# Patient Record
Sex: MALE | Race: BLACK OR AFRICAN AMERICAN | Employment: OTHER | URBAN - METROPOLITAN AREA
[De-identification: names, ages, dates, MRNs, and addresses within clinical notes are randomized per-mention and may not be internally consistent; named-entity substitution may affect disease eponyms.]

---

## 2018-09-14 ENCOUNTER — PREP FOR PROCEDURE (OUTPATIENT)
Dept: SURGERY | Age: 38
End: 2018-09-14

## 2018-09-14 RX ORDER — SODIUM CHLORIDE 9 MG/ML
INJECTION, SOLUTION INTRAVENOUS CONTINUOUS
Status: CANCELLED | OUTPATIENT
Start: 2018-09-14 | End: 2019-09-14

## 2018-09-17 RX ORDER — ALBUTEROL SULFATE 2.5 MG/3ML
2.5 SOLUTION RESPIRATORY (INHALATION) EVERY 6 HOURS PRN
COMMUNITY

## 2018-09-17 NOTE — PROGRESS NOTES
Matthew 35 PRE-ADMISSION TESTING GENERAL INSTRUCTIONS- [facility]-phone number:[phone]    GENERAL INSTRUCTIONS  [x] Antibacterial Soap shower Night before and/or AM of Surgery  [] Uriel wipe instruction sheet and wipes given. [x] Nothing by mouth after midnight, including gum, candy, mints, or water. [x] You may brush your teeth, gargle, but do NOT swallow water. []Hibiclens shower  the night before and the morning of surgery. Do not use             Hibiclens on your face or head. [x]No smoking, chewing tobacco, illegal drugs, or alcohol within 24 hours of your surgery. [x] Jewelry, valuables or body piercing's should not be brought to the hospital. All body and/or tongue piercing's must be removed prior to arriving to hospital.  ALL hair pins must be removed. [x] Do not wear makeup, lotions, powders, deodorant. Nail polish as directed by the nurse. [x] Arrange transportation to and from the hospital.  Arrange for someone to be with you for the remainder of the day and for 24 hours after your procedure due to having had anesthesia. [x] Bring insurance card and photo ID.  [] Transfusion Bracelet: Please bring with you to hospital, day of surgery  [] Bring urine specimen day of surgery. Any small container is acceptable. [x] Use inhalers the morning of surgery and bring with you to hospital.   []Bring copy of living will or healthcare power of  papers to be placed in your electronic record. [] CPAP/BI-PAP: Please bring your machine if you are to spend the night in the hospital.     ENDOSCOPY INSTRUCTIONS:   [x] Bowel prep instructions reviewed. [] Nothing by mouth after midnight, including gum, candy, mints, or water.  [] You may brush your teeth, gargle, but do NOT swallow water. [x] Do not wear makeup, lotions, powders, deodorant. Nail polish as directed by the nurse.   [] Arrange transportation to and from the hospital.  Arrange for someone to be with you for the remainder of the day due to having had anesthesia. PARKING INSTRUCTIONS:   [x] Arrival [id number]  · [x] Parking lot 1 is located on [facility] (the corner of [facility] and [facility]). To enter, press the button and the gate will lift. A free token will be provided to exit the lot. One car per patient is allowed to park in this lot. All other cars are to park on [address] [place] [address] Street either in the parking garage or the handicap lot. [] Free  parking is available on [address] [place] [address]. · [] To reach the [facility] lobby from [address] [place] [address], upon entering the hospital, take elevator B to the 3rd floor. EDUCATION INSTRUCTIONS:      [] Knee or hip replacement booklet & exercise pamphlets given. [] Maggieu [address] placed in chart. [] Pre-admission Testing educational folder given  [] Incentive Spirometry,coughing & deep breathing exercises reviewed. []Medication information sheet(s)   []Fluoroscopy-Xray used in surgery reviewed with patient. Educational pamphlet placed in chart. [x]Pain: Post-op pain is normal and to be expected. You will be asked to rate your pain from 0-10(a zero is not acceptable-education is needed). Your post-op pain goal is:2[] Ask your nurse for your pain medication. [] Joint camp offered. [] Joint replacement booklets given. []Other     MEDICATION INSTRUCTIONS:   []Bring a complete list of your medications, please write the last time you took the medicine, give this list to the nurse. [x] Take the following medications the morning of surgery with 1-2 ounces of water: see med list[x] Stop herbal supplements and vitamins 5 days before your surgery. [] DO NOT take any diabetic medicine the morning of surgery. Follow instructions for insulin the day before surgery. [] If you are diabetic and your blood sugar is low or you feel symptomatic, you may drink 1-2 ounces of apple juice or take a glucose tablet.   The morning of your procedure, you

## 2018-09-20 ENCOUNTER — ANESTHESIA (OUTPATIENT)
Dept: ENDOSCOPY | Age: 38
End: 2018-09-20
Payer: MEDICARE

## 2018-09-20 ENCOUNTER — ANESTHESIA EVENT (OUTPATIENT)
Dept: ENDOSCOPY | Age: 38
End: 2018-09-20
Payer: MEDICARE

## 2018-09-20 ENCOUNTER — HOSPITAL ENCOUNTER (OUTPATIENT)
Age: 38
Setting detail: OUTPATIENT SURGERY
Discharge: HOME | End: 2018-09-20
Attending: SURGERY | Admitting: SURGERY
Payer: MEDICARE

## 2018-09-20 VITALS
SYSTOLIC BLOOD PRESSURE: 114 MMHG | BODY MASS INDEX: 35.85 KG/M2 | DIASTOLIC BLOOD PRESSURE: 60 MMHG | WEIGHT: 210 LBS | RESPIRATION RATE: 18 BRPM | TEMPERATURE: 97.6 F | HEIGHT: 64 IN | HEART RATE: 88 BPM | OXYGEN SATURATION: 98 %

## 2018-09-20 VITALS
OXYGEN SATURATION: 95 % | SYSTOLIC BLOOD PRESSURE: 111 MMHG | RESPIRATION RATE: 17 BRPM | DIASTOLIC BLOOD PRESSURE: 78 MMHG

## 2018-09-20 PROCEDURE — 3700000000 HC ANESTHESIA ATTENDED CARE: Performed by: SURGERY

## 2018-09-20 PROCEDURE — 6360000002 HC RX W HCPCS: Performed by: NURSE ANESTHETIST, CERTIFIED REGISTERED

## 2018-09-20 PROCEDURE — 3700000001 HC ADD 15 MINUTES (ANESTHESIA): Performed by: SURGERY

## 2018-09-20 PROCEDURE — 2580000003 HC RX 258: Performed by: NURSE ANESTHETIST, CERTIFIED REGISTERED

## 2018-09-20 PROCEDURE — 3609009500 HC COLONOSCOPY DIAGNOSTIC OR SCREENING: Performed by: SURGERY

## 2018-09-20 PROCEDURE — 2580000003 HC RX 258: Performed by: SURGERY

## 2018-09-20 PROCEDURE — 7100000010 HC PHASE II RECOVERY - FIRST 15 MIN: Performed by: SURGERY

## 2018-09-20 PROCEDURE — 7100000011 HC PHASE II RECOVERY - ADDTL 15 MIN: Performed by: SURGERY

## 2018-09-20 RX ORDER — SODIUM CHLORIDE 9 MG/ML
INJECTION, SOLUTION INTRAVENOUS CONTINUOUS
Status: DISCONTINUED | OUTPATIENT
Start: 2018-09-20 | End: 2018-09-20 | Stop reason: HOSPADM

## 2018-09-20 RX ORDER — SODIUM CHLORIDE 9 MG/ML
INJECTION, SOLUTION INTRAVENOUS CONTINUOUS PRN
Status: DISCONTINUED | OUTPATIENT
Start: 2018-09-20 | End: 2018-09-20 | Stop reason: SDUPTHER

## 2018-09-20 RX ORDER — 0.9 % SODIUM CHLORIDE 0.9 %
10 VIAL (ML) INJECTION PRN
Status: DISCONTINUED | OUTPATIENT
Start: 2018-09-20 | End: 2018-09-20 | Stop reason: HOSPADM

## 2018-09-20 RX ORDER — PROPOFOL 10 MG/ML
INJECTION, EMULSION INTRAVENOUS PRN
Status: DISCONTINUED | OUTPATIENT
Start: 2018-09-20 | End: 2018-09-20 | Stop reason: SDUPTHER

## 2018-09-20 RX ORDER — 0.9 % SODIUM CHLORIDE 0.9 %
10 VIAL (ML) INJECTION EVERY 12 HOURS SCHEDULED
Status: DISCONTINUED | OUTPATIENT
Start: 2018-09-20 | End: 2018-09-20 | Stop reason: HOSPADM

## 2018-09-20 RX ADMIN — PROPOFOL 100 MG: 10 INJECTION, EMULSION INTRAVENOUS at 10:30

## 2018-09-20 RX ADMIN — PROPOFOL 30 MG: 10 INJECTION, EMULSION INTRAVENOUS at 10:40

## 2018-09-20 RX ADMIN — PROPOFOL 50 MG: 10 INJECTION, EMULSION INTRAVENOUS at 10:33

## 2018-09-20 RX ADMIN — PROPOFOL 150 MG: 10 INJECTION, EMULSION INTRAVENOUS at 10:25

## 2018-09-20 RX ADMIN — PROPOFOL 70 MG: 10 INJECTION, EMULSION INTRAVENOUS at 10:35

## 2018-09-20 RX ADMIN — SODIUM CHLORIDE: 9 INJECTION, SOLUTION INTRAVENOUS at 10:13

## 2018-09-20 RX ADMIN — SODIUM CHLORIDE: 9 INJECTION, SOLUTION INTRAVENOUS at 10:25

## 2018-09-20 ASSESSMENT — PAIN SCALES - GENERAL
PAINLEVEL_OUTOF10: 0

## 2018-09-20 ASSESSMENT — PAIN - FUNCTIONAL ASSESSMENT: PAIN_FUNCTIONAL_ASSESSMENT: 0-10

## 2018-09-20 ASSESSMENT — PAIN DESCRIPTION - DESCRIPTORS: DESCRIPTORS: ACHING;CRUSHING;DISCOMFORT

## 2018-09-20 NOTE — OP NOTE
Pre op Dx:  Rectal bleeding    Post oop Dx: Internal and external hemorrhoids    Procedure: Total colonoscopy. Findings:  NOrmal colon. Mac anesthesia    Tolerated well. To RR stable. Dictated.     Black Cabral MD, FACS

## 2018-09-20 NOTE — ANESTHESIA PRE PROCEDURE
210 lb (95.3 kg)   Height: 5' 4\" (1.626 m)                                              BP Readings from Last 3 Encounters:   11/07/17 115/71   10/23/17 (!) 143/97       NPO Status: Time of last liquid consumption: 2100                        Time of last solid consumption: 1700                        Date of last liquid consumption: 09/19/18                        Date of last solid food consumption: 09/18/18    BMI:   Wt Readings from Last 3 Encounters:   09/17/18 210 lb (95.3 kg)   11/07/17 220 lb (99.8 kg)   10/23/17 220 lb (99.8 kg)     Body mass index is 36.05 kg/m². CBC: No results found for: WBC, RBC, HGB, HCT, MCV, RDW, PLT    CMP: No results found for: NA, K, CL, CO2, BUN, CREATININE, GFRAA, AGRATIO, LABGLOM, GLUCOSE, PROT, CALCIUM, BILITOT, ALKPHOS, AST, ALT    POC Tests: No results for input(s): POCGLU, POCNA, POCK, POCCL, POCBUN, POCHEMO, POCHCT in the last 72 hours. Coags: No results found for: PROTIME, INR, APTT    HCG (If Applicable): No results found for: PREGTESTUR, PREGSERUM, HCG, HCGQUANT     ABGs: No results found for: PHART, PO2ART, WUE1AMP, IBT6NYT, BEART, C9YVQHNR     Type & Screen (If Applicable):  No results found for: LABABO, [address]    Anesthesia Evaluation  Patient summary reviewed and Nursing notes reviewed no history of anesthetic complications:   Airway: Mallampati: III  TM distance: >3 FB   Neck ROM: full  Mouth opening: > = 3 FB Dental: normal exam         Pulmonary:   (+) asthma:                           ROS comment: Inhaler prn    Cardiovascular:Negative CV ROS  Exercise tolerance: good (>4 METS),           Rhythm: regular  Rate: normal           Beta Blocker:  Not on Beta Blocker         Neuro/Psych:                ROS comment: Hx cerebral palsy   Deaf   Hx obtained from patient with   GI/Hepatic/Renal:   (+) bowel prep,          ROS comment: Bloody stool per patient .    Endo/Other: Negative Endo/Other ROS                    Abdominal:           Vascular: negative vascular ROS. Anesthesia Plan      MAC     ASA 3       Induction: intravenous. Anesthetic plan and risks discussed with patient and legal guardian. Plan discussed with attending. Anshul Yates RN   9/20/2018    Agree with above assessment. Physical exam unchanged. Spoke to patient about anesthetic plan.   Patient understands and wishes to proceed.  (this addendum was done preop but unable to be filed electronically at that time)

## 2020-04-08 ENCOUNTER — TELEPHONE (OUTPATIENT)
Dept: INTERNAL MEDICINE | Age: 40
End: 2020-04-08

## 2023-11-02 ENCOUNTER — PROCEDURE VISIT (OUTPATIENT)
Dept: AUDIOLOGY | Age: 43
End: 2023-11-02
Payer: MEDICARE

## 2023-11-02 ENCOUNTER — OFFICE VISIT (OUTPATIENT)
Dept: ENT CLINIC | Age: 43
End: 2023-11-02
Payer: MEDICARE

## 2023-11-02 VITALS — WEIGHT: 210 LBS | BODY MASS INDEX: 35.85 KG/M2 | HEIGHT: 64 IN

## 2023-11-02 DIAGNOSIS — H90.3 SENSORINEURAL HEARING LOSS (SNHL) OF BOTH EARS: Primary | ICD-10-CM

## 2023-11-02 DIAGNOSIS — F80.4 SPEECH AND LANGUAGE DEVELOPMENT DELAY DUE TO HEARING LOSS: ICD-10-CM

## 2023-11-02 DIAGNOSIS — F80.9 COMMUNICATION PROBLEM: ICD-10-CM

## 2023-11-02 DIAGNOSIS — H90.3 BILATERAL SENSORINEURAL HEARING LOSS: Primary | ICD-10-CM

## 2023-11-02 PROCEDURE — 1036F TOBACCO NON-USER: CPT | Performed by: OTOLARYNGOLOGY

## 2023-11-02 PROCEDURE — 92557 COMPREHENSIVE HEARING TEST: CPT | Performed by: AUDIOLOGIST

## 2023-11-02 PROCEDURE — G8427 DOCREV CUR MEDS BY ELIG CLIN: HCPCS | Performed by: OTOLARYNGOLOGY

## 2023-11-02 PROCEDURE — G8484 FLU IMMUNIZE NO ADMIN: HCPCS | Performed by: OTOLARYNGOLOGY

## 2023-11-02 PROCEDURE — G8417 CALC BMI ABV UP PARAM F/U: HCPCS | Performed by: OTOLARYNGOLOGY

## 2023-11-02 PROCEDURE — 99205 OFFICE O/P NEW HI 60 MIN: CPT | Performed by: OTOLARYNGOLOGY

## 2023-11-02 PROCEDURE — 92567 TYMPANOMETRY: CPT | Performed by: AUDIOLOGIST

## 2023-11-02 NOTE — PROGRESS NOTES
This patient was referred for audiometric and tympanometric testing by Dr. Huma Moreno due to  longstanding bilateral hearing loss . Patient's parent reported patient has had longstanding hearing loss and wore hearing aids until graduation from school. Audiometry using pure tone air and bone conduction testing revealed severe to profound sensorineural hearing loss, bilaterally. Reliability was good. Speech detection thresholds were in good agreement with the pure tone averages, bilaterally. Speech discrimination testing could not be performed, bilaterally. Tympanometry revealed normal middle ear peak pressure and hypercompliance, bilaterally. The results were reviewed with the patient. Programmed Bibi UP hearing aids with today's test results. Recommendations for follow up will be made pending physician consult.       Juan Manuel Phillips [facility]  [address] [place] [address]   Electronically signed by Juan Manuel Phillips on 11/2/2023 at 11:51 AM

## 2023-11-21 ENCOUNTER — TELEPHONE (OUTPATIENT)
Dept: ENT CLINIC | Age: 43
End: 2023-11-21

## 2023-11-28 ENCOUNTER — HOSPITAL ENCOUNTER (OUTPATIENT)
Dept: MRI IMAGING | Age: 43
Discharge: HOME | End: 2023-11-30
Attending: OTOLARYNGOLOGY
Payer: MEDICARE

## 2023-11-28 ENCOUNTER — HOSPITAL ENCOUNTER (OUTPATIENT)
Dept: CT IMAGING | Age: 43
Discharge: HOME | End: 2023-11-30
Attending: OTOLARYNGOLOGY
Payer: MEDICARE

## 2023-11-28 DIAGNOSIS — F80.4 SPEECH AND LANGUAGE DEVELOPMENT DELAY DUE TO HEARING LOSS: ICD-10-CM

## 2023-11-28 DIAGNOSIS — H90.3 BILATERAL SENSORINEURAL HEARING LOSS: ICD-10-CM

## 2023-11-28 PROCEDURE — 70480 CT ORBIT/EAR/FOSSA W/O DYE: CPT

## 2023-11-28 PROCEDURE — 6360000004 HC RX CONTRAST MEDICATION: Performed by: RADIOLOGY

## 2023-11-28 PROCEDURE — A9579 GAD-BASE MR CONTRAST NOS,1ML: HCPCS | Performed by: RADIOLOGY

## 2023-11-28 PROCEDURE — 70553 MRI BRAIN STEM W/O & W/DYE: CPT

## 2023-11-28 RX ADMIN — GADOTERIDOL 20 ML: 279.3 INJECTION, SOLUTION INTRAVENOUS at 10:57

## 2024-01-16 NOTE — PROGRESS NOTES
CC:   Chief Complaint   Patient presents with    Follow-up     F/U CT/MRI CI     Nehemias Schwarz is a 43 y.o. 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 He is working and is interested in hearing better. He did wear hearing aids until he was done with high school. Mother states he has a learning disability; patient does use ASL and was seen with his mother and ASL interpretor                  PAST MEDICAL HISTORY:   Past Medical History:   Diagnosis Date    Asthma     Cerebral palsy (HCC)     Deaf         PAST SURGICAL HISTORY:   Past Surgical History:   Procedure Laterality Date    ABDOMEN SURGERY  1979    colostomy    ABDOMEN SURGERY  1979    reversal 2mo later    [facility] COLONOSCOPY FLX DX W/COLLJ SPEC WHEN PFRMD N/A 9/20/2018    COLONOSCOPY - hearing impaired performed by Randy Haji MD at [facility]        SOCIAL HISTORY:   Social History     Socioeconomic History    Marital status: Single     Spouse name: Not on file    Number of children: Not on file    Years of education: Not on file    Highest education level: Not on file   Occupational History    Not on file   Tobacco Use    Smoking status: Never     Passive exposure: Never    Smokeless tobacco: Never   Substance and Sexual Activity    Alcohol use: No    Drug use: No    Sexual activity: Not on file   Other Topics Concern    Not on file   Social History Narrative    Not on file     Social Determinants of Health     Financial Resource Strain: Not on file   Food Insecurity: Not on file   Transportation Needs: Not on file   Physical Activity: Not on file   Stress: Not on file   Social Connections: Not on file   Intimate Partner Violence: Not on file   Housing Stability: Not on file        TOBACCO  Social History     Tobacco Use

## 2024-01-18 ENCOUNTER — EVALUATION (OUTPATIENT)
Age: 44
End: 2024-01-18
Payer: MEDICARE

## 2024-01-18 ENCOUNTER — PROCEDURE VISIT (OUTPATIENT)
Dept: AUDIOLOGY | Age: 44
End: 2024-01-18
Payer: MEDICARE

## 2024-01-18 ENCOUNTER — OFFICE VISIT (OUTPATIENT)
Dept: ENT CLINIC | Age: 44
End: 2024-01-18
Payer: MEDICARE

## 2024-01-18 VITALS — WEIGHT: 210 LBS | BODY MASS INDEX: 35.85 KG/M2 | HEIGHT: 64 IN

## 2024-01-18 DIAGNOSIS — F80.4 SPEECH AND LANGUAGE DEVELOPMENT DELAY DUE TO HEARING LOSS: ICD-10-CM

## 2024-01-18 DIAGNOSIS — H90.3 SENSORINEURAL HEARING LOSS (SNHL) OF BOTH EARS: Primary | ICD-10-CM

## 2024-01-18 DIAGNOSIS — H90.3 BILATERAL SENSORINEURAL HEARING LOSS: Primary | ICD-10-CM

## 2024-01-18 DIAGNOSIS — F80.9 COMMUNICATION PROBLEM: Primary | ICD-10-CM

## 2024-01-18 DIAGNOSIS — F80.9 COMMUNICATION PROBLEM: ICD-10-CM

## 2024-01-18 PROCEDURE — 99215 OFFICE O/P EST HI 40 MIN: CPT | Performed by: OTOLARYNGOLOGY

## 2024-01-18 PROCEDURE — G8427 DOCREV CUR MEDS BY ELIG CLIN: HCPCS | Performed by: OTOLARYNGOLOGY

## 2024-01-18 PROCEDURE — 1036F TOBACCO NON-USER: CPT | Performed by: OTOLARYNGOLOGY

## 2024-01-18 PROCEDURE — G8484 FLU IMMUNIZE NO ADMIN: HCPCS | Performed by: OTOLARYNGOLOGY

## 2024-01-18 PROCEDURE — 92523 SPEECH SOUND LANG COMPREHEN: CPT | Performed by: SPEECH-LANGUAGE PATHOLOGIST

## 2024-01-18 PROCEDURE — 92626 EVAL AUD FUNCJ 1ST HOUR: CPT | Performed by: AUDIOLOGIST

## 2024-01-18 PROCEDURE — G8417 CALC BMI ABV UP PARAM F/U: HCPCS | Performed by: OTOLARYNGOLOGY

## 2024-01-18 NOTE — PROGRESS NOTES
[facility] SPEECH/ENT  SPEECH/LANGUAGE PATHOLOGY  SPEECH/LANGUAGE/COGNITIVE EVALUATION   and PLAN OF CARE      PATIENT NAME:  Nehemias Schwarz  (male)     MRN:  [id number]    :  1980  (43 y.o.)  STATUS:  Outpatient clinic   TODAY'S DATE:  2024  REFERRING PROVIDER:    Marta Garrison MD  SPECIFIC PROVIDER ORDER: SLP eval and treat  Date of order:  2024  EVALUATING THERAPIST: DIEGO Schofield    CERTIFICATION/RECERTIFICATION PERIOD: 2024 to 24  INSURANCE PROVIDER:  Payor: MEDICARE / Plan: MEDICARE PART A AND B / Product Type: *No Product type* /    INSURANCE ID:  [id number] - (Medicare)   SECONDARY INSURANCE (if applicable):        CPT Codes  EVALUATION: 44880 Evaluation of Speech Sound Language Comprehension     60 Minutes     TREATMENT:  Requesting treatment authorization for  30 visits over 30 weeks focusing on the following CPT codes:      73737 Speech/Language Therapy     30-45 Minutes    REFERRING/TREATMENT DIAGNOSIS: No admission diagnoses are documented for this encounter.          SPEECH THERAPY  PLAN OF CARE   The speech therapy  POC is established based on physician order, speech pathology diagnosis and results of clinical assessment      SPEECH PATHOLOGY DIAGNOSIS:   Significant Speech Perception Deficit secondary to Hearing Loss     Outpatient Speech Pathology intervention is recommended 1 time per week to begin two weeks post CI activation.       SHORT/LONG TERM GOALS     Detect all Ling-6 sounds at 3 ft., 6 ft., and 9 ft.      2. Discriminate between two words that differ in syllable length achieving greater than 90% accuracy.      3. Identify the correct word given a set of 5-10 familiar words achieving greater than 90% accuracy.      4. Answer questions or follow directions presented auditorily regarding a known topic achieving greater than 90% accuracy.      5.  Identify the correct monosyllabic word given a set of 4-6 familiar words achieving greater than 90%

## 2024-01-18 NOTE — PATIENT INSTRUCTIONS
pediatrician, primary care physician, or other health professional about vaccination with Prenar ® (7-valent pneumococcal conjugate vaccine), Pneumovovax ® 23 or Pnulmune ® 23 (23-valent pneumococcal polysaccharide vaccine), and Haemophilus Influenzae Type B (hib) vaccine.  In [place], the Center of Disease Control and [facility] [place] already recommends vaccination against bacterial meningitis as part of the routine vaccination programs for all children.  Most cochlear implant surgeons now specifically require candidates be vaccinated against bacterial meningitis prior to implantation.      Benefits from the Cochlear Implant    The cochlear implant system, when used in conjunction with speech reading, should                                                            improve your communication abilities.  With the cochlear implant you should be able to hear environmental sounds which you were unable to hear with your hearing aid.  Many, but not all, implant users can understand great deal of speech with no visual cues, and many, but not all, implant users can understand speech over the telephone   Given the appropriate follow-up children can develop normal speech and language using a cochlear implant.  Many factors, including parental involvement, school placement, intelligence, age at implantation, previous experience with sound, and other things we may not understand can affect the child's ability to use the input from the cochlear implant.    REMEMBER:  1.  COCHLEAR IMPLANTS DO NOT RESTORE NORMAL HEARING  2.  IT MAY TAKE SEVERAL MONTHS OR LONGER TO ACHIEVE MAXIMUM BENEFITS FROM THE COCHLEAR IMPLAN  3. COCHLEAR IMPLANTS CAN BREAK DOWN  4.YOUR EXPERIENCE WILL NOT BE IDENTICAL TO THAT OF ANY OTHER IMPLANT USER  5.NEITHER THE PHYSICIAN NOR THE IMPLANT AUDIOLOGIST CAN ANTICIPATE   EVERYTHING THAT YOU MIGHT EXPERIENCE  6. COCHLEAR IMPLANTS DO NOT RESTORE HEARING       Alternatives  You may continue the use of power  FAMILY HISTORY:  FH: HIV infection, mother    Mother  Still living? Unknown  Family history of diabetes mellitus, Age at diagnosis: Age Unknown

## 2024-01-18 NOTE — PROGRESS NOTES
Patient was referred by Dr. Garrison for audiometric testing, due to possible CI candidacy. He reported a longstanding bilateral hearing loss. Patient reported a history of hearing aid use, as a child.     Previous audiometric testing revealed severe to profound sensorineural hearing loss, bilaterally.    Clinic owned Xceed hearing aids were programmed to using the NAL-NL2 fitting rationale. Hearing aids were verified and found to be appropriate for patient's hearing loss.     Aided testing was completed at 60 .     Results    RIGHT    CNC Words 0%   AzBio Quiet 0%     LEFT    CNC Words 0%   AzBio Quiet 0%     BILATERAL    CNC Words 0%   AzBio Quiet 0%     The results were reviewed with the patient and Dr. Garrison.     Patient and patient's parent reported they would like to proceed with cochlear implant, on the right. Patient's parent reported they had chosen [employer], due to MRI compatibility. Clarified that all companies are MRI compatible now. Discussed three  options, patient and parent reported they would still like [employer]. Picked [employer], [place] and [employer] in black, with AudioLink kit as accessory.       Juan Manuel Magana CCC-A  [facility] [id number]   Electronically signed by Juan Manuel Magana on 1/18/2024 at 3:00 PM      Note: At least 35 minutes spent face to face with patient collecting case history, performing tests, and reviewing results.

## 2024-01-19 ENCOUNTER — TELEPHONE (OUTPATIENT)
Dept: ENT CLINIC | Age: 44
End: 2024-01-19

## 2024-01-19 ENCOUNTER — OFFICE VISIT (OUTPATIENT)
Dept: PRIMARY CARE CLINIC | Age: 44
End: 2024-01-19
Payer: MEDICARE

## 2024-01-19 DIAGNOSIS — F80.9 COMMUNICATION PROBLEM: Primary | ICD-10-CM

## 2024-01-19 DIAGNOSIS — Z23 NEED FOR 23-POLYVALENT PNEUMOCOCCAL POLYSACCHARIDE VACCINE: Primary | ICD-10-CM

## 2024-01-19 DIAGNOSIS — H90.3 BILATERAL SENSORINEURAL HEARING LOSS: ICD-10-CM

## 2024-01-19 PROCEDURE — 90732 PPSV23 VACC 2 YRS+ SUBQ/IM: CPT | Performed by: NURSE PRACTITIONER

## 2024-01-19 PROCEDURE — 99999 PR OFFICE/OUTPT VISIT,PROCEDURE ONLY: CPT | Performed by: NURSE PRACTITIONER

## 2024-01-19 PROCEDURE — G0009 ADMIN PNEUMOCOCCAL VACCINE: HCPCS | Performed by: NURSE PRACTITIONER

## 2024-01-23 ENCOUNTER — TELEPHONE (OUTPATIENT)
Dept: ENT CLINIC | Age: 44
End: 2024-01-23

## 2024-01-23 ENCOUNTER — PREP FOR PROCEDURE (OUTPATIENT)
Dept: ENT CLINIC | Age: 44
End: 2024-01-23

## 2024-01-23 DIAGNOSIS — H90.3 SENSORY HEARING LOSS, BILATERAL: ICD-10-CM

## 2024-01-23 NOTE — TELEPHONE ENCOUNTER
Prior Authorization Form:      DEMOGRAPHICS:                     Patient Name:  Jackie Damon  Patient :  1980            Insurance:  Payor: MEDICARE / Plan: MEDICARE PART A AND B / Product Type: *No Product type* /   Insurance ID Number:    Payer/Plan Subscr  Sex Relation Sub. Ins. ID Effective Group Num   1. MEDICARE - [place]* JACKIE DAMON 1980 Male Self [id number] 1/1/15                                    [address] [address]   2. [place]ICAID [place] -* JACKIE DAMON 1980 Male Self [id number]                                    [address]         DIAGNOSIS & PROCEDURE:                       Procedure/Operation: RIGHT COCHLEAR IMPLANT WITH FACIAL NERVE MONITORING           CPT Code: 72315,     Diagnosis:  BILATERAL PROFOUND SENSORINEURAL HEARING LOSS     ICD10 Code: H90.3    Location:  [facility]    Surgeon:  VELVET CARABALLO    SCHEDULING INFORMATION:                          Date: 2024    Time: NA              Anesthesia:  General                                                       Status:  Outpatient        Special Comments:  PLEASE SUBMIT ALL AUDIO RECORDS, SPEECH RECORDS, MRI, CT, AND PROOF OF PREVNAR VACCINE        Electronically signed by Belgica Winslow MA on 2024 at 9:55 AM

## 2024-01-29 ENCOUNTER — TELEPHONE (OUTPATIENT)
Dept: AUDIOLOGY | Age: 44
End: 2024-01-29

## 2024-01-29 NOTE — TELEPHONE ENCOUNTER
----- Message from Marta Garrison MD sent at 1/25/2024  4:25 PM EST -----  Flex 24  thanks  ----- Message -----  From: Sangita Mack AuD  Sent: 1/25/2024   9:39 AM EST  To: Marta Garrison MD; Juan Manuel Magana; #    What electrode array would you prefer that we order for patient? Thank you.    Electronically signed by Juan Manuel Peters on 1/25/2024 at 9:38 AM

## 2024-02-09 RX ORDER — DOCUSATE SODIUM 100 MG/1
100 CAPSULE, LIQUID FILLED ORAL DAILY
COMMUNITY

## 2024-02-09 RX ORDER — ALBUTEROL SULFATE 90 UG/1
AEROSOL, METERED RESPIRATORY (INHALATION)
COMMUNITY
Start: 2023-12-20

## 2024-02-09 NOTE — PROGRESS NOTES
[facility] PRE-ADMISSION TESTING INSTRUCTIONS    The Preadmission Testing patient is instructed accordingly using the following criteria (check applicable):    ARRIVAL INSTRUCTIONS:  [x] Parking the day of Surgery is located in the Main Entrance lot.  Upon entering the door, make an immediate right to the surgery reception desk    [x] Bring photo ID and insurance card    [x] Bring in a copy of Living will or Durable Power of  papers.Guardianship documents    [x] Please be sure to arrange for responsible adult to provide transportation to and from the hospital    [x] Please arrange for responsible adult to be with you for the 24 hour period post procedure due to having anesthesia    [x] If you awake am of surgery not feeling well or have temperature >100 please call [phone]    GENERAL INSTRUCTIONS:    [x] Nothing by mouth after midnight, including gum, candy, mints or water    [x] You may brush your teeth, but do not swallow any water    [x] Take medications as instructed with 1-2 oz of water    [x] Stop herbal supplements and vitamins 5 days prior to procedure    [] Follow preop dosing of blood thinners per physician instructions    [] Take 1/2 dose of evening insulin, but no insulin after midnight    [] No oral diabetic medications after midnight    [] If diabetic and have low blood sugar or feel symptomatic, take 1-2oz apple juice only    [x] Bring inhalers day of surgery    [] Bring C-PAP/ Bi-Pap day of surgery    [] Bring urine specimen day of surgery    [x] Shower or bath with soap, lather and rinse well, AM of Surgery, no lotion, powders or creams    [] Follow bowel prep as instructed per surgeon    [x] No tobacco products within 24 hours of surgery     [x] No alcohol or illegal drug use within 24 hours of surgery.    [x] Jewelry, body piercing's, eyeglasses, contact lenses and dentures are not permitted into surgery (bring cases)      [] Please do not wear any nail

## 2024-02-09 NOTE — PROGRESS NOTES
Emailed language services at [facility] requesting in patient  for DOS 2/14/24 per pt and mothers request.

## 2024-02-12 NOTE — PROGRESS NOTES
Emailed language services to verify in person  will be available DOS 2/14/23, They are currently working on getting an .

## 2024-02-13 NOTE — PROGRESS NOTES
Language services emailed Nurse this morning, that Laurita Stovall In person  will be here DOS 2/14/24 for patient @ 1145.

## 2024-02-14 ENCOUNTER — ANESTHESIA EVENT (OUTPATIENT)
Dept: OPERATING ROOM | Age: 44
End: 2024-02-14
Payer: MEDICARE

## 2024-02-14 ENCOUNTER — ANESTHESIA (OUTPATIENT)
Dept: OPERATING ROOM | Age: 44
End: 2024-02-14
Payer: MEDICARE

## 2024-02-14 ENCOUNTER — HOSPITAL ENCOUNTER (OUTPATIENT)
Age: 44
Setting detail: OUTPATIENT SURGERY
Discharge: HOME | End: 2024-02-14
Attending: OTOLARYNGOLOGY | Admitting: OTOLARYNGOLOGY
Payer: MEDICARE

## 2024-02-14 VITALS
DIASTOLIC BLOOD PRESSURE: 87 MMHG | BODY MASS INDEX: 35.51 KG/M2 | SYSTOLIC BLOOD PRESSURE: 148 MMHG | HEART RATE: 77 BPM | TEMPERATURE: 96.5 F | HEIGHT: 64 IN | RESPIRATION RATE: 18 BRPM | OXYGEN SATURATION: 97 % | WEIGHT: 208 LBS

## 2024-02-14 DIAGNOSIS — H90.3 SENSORY HEARING LOSS, BILATERAL: Primary | ICD-10-CM

## 2024-02-14 PROCEDURE — L8619 COCH IMP EXT PROC/CONTR RPLC: HCPCS | Performed by: OTOLARYNGOLOGY

## 2024-02-14 PROCEDURE — 2709999900 HC NON-CHARGEABLE SUPPLY: Performed by: OTOLARYNGOLOGY

## 2024-02-14 PROCEDURE — 6360000002 HC RX W HCPCS: Performed by: OTOLARYNGOLOGY

## 2024-02-14 PROCEDURE — 3700000000 HC ANESTHESIA ATTENDED CARE: Performed by: OTOLARYNGOLOGY

## 2024-02-14 PROCEDURE — 2580000003 HC RX 258: Performed by: NURSE ANESTHETIST, CERTIFIED REGISTERED

## 2024-02-14 PROCEDURE — 7100000001 HC PACU RECOVERY - ADDTL 15 MIN: Performed by: OTOLARYNGOLOGY

## 2024-02-14 PROCEDURE — 6360000002 HC RX W HCPCS: Performed by: STUDENT IN AN ORGANIZED HEALTH CARE EDUCATION/TRAINING PROGRAM

## 2024-02-14 PROCEDURE — 69799 UNLISTED PX MIDDLE EAR: CPT | Performed by: OTOLARYNGOLOGY

## 2024-02-14 PROCEDURE — L8614 COCHLEAR DEVICE: HCPCS | Performed by: OTOLARYNGOLOGY

## 2024-02-14 PROCEDURE — 2580000003 HC RX 258: Performed by: STUDENT IN AN ORGANIZED HEALTH CARE EDUCATION/TRAINING PROGRAM

## 2024-02-14 PROCEDURE — 3700000001 HC ADD 15 MINUTES (ANESTHESIA): Performed by: OTOLARYNGOLOGY

## 2024-02-14 PROCEDURE — 7100000011 HC PHASE II RECOVERY - ADDTL 15 MIN: Performed by: OTOLARYNGOLOGY

## 2024-02-14 PROCEDURE — 20922 REMOVAL OF FASCIA FOR GRAFT: CPT | Performed by: OTOLARYNGOLOGY

## 2024-02-14 PROCEDURE — 3600000004 HC SURGERY LEVEL 4 BASE: Performed by: OTOLARYNGOLOGY

## 2024-02-14 PROCEDURE — 2720000010 HC SURG SUPPLY STERILE: Performed by: OTOLARYNGOLOGY

## 2024-02-14 PROCEDURE — 2500000003 HC RX 250 WO HCPCS: Performed by: OTOLARYNGOLOGY

## 2024-02-14 PROCEDURE — 69930 IMPLANT COCHLEAR DEVICE: CPT | Performed by: OTOLARYNGOLOGY

## 2024-02-14 PROCEDURE — 7100000000 HC PACU RECOVERY - FIRST 15 MIN: Performed by: OTOLARYNGOLOGY

## 2024-02-14 PROCEDURE — 6360000002 HC RX W HCPCS: Performed by: NURSE ANESTHETIST, CERTIFIED REGISTERED

## 2024-02-14 PROCEDURE — 7100000010 HC PHASE II RECOVERY - FIRST 15 MIN: Performed by: OTOLARYNGOLOGY

## 2024-02-14 PROCEDURE — 6370000000 HC RX 637 (ALT 250 FOR IP): Performed by: OTOLARYNGOLOGY

## 2024-02-14 PROCEDURE — 3600000014 HC SURGERY LEVEL 4 ADDTL 15MIN: Performed by: OTOLARYNGOLOGY

## 2024-02-14 PROCEDURE — 92652 AEP THRSHLD EST MLT FREQ I&R: CPT

## 2024-02-14 RX ORDER — SODIUM CHLORIDE 9 MG/ML
INJECTION, SOLUTION INTRAVENOUS CONTINUOUS PRN
Status: DISCONTINUED | OUTPATIENT
Start: 2024-02-14 | End: 2024-02-14 | Stop reason: SDUPTHER

## 2024-02-14 RX ORDER — SODIUM CHLORIDE 0.9 % (FLUSH) 0.9 %
3 SYRINGE (ML) INJECTION PRN
Status: DISCONTINUED | OUTPATIENT
Start: 2024-02-14 | End: 2024-02-14 | Stop reason: HOSPADM

## 2024-02-14 RX ORDER — PROPOFOL 10 MG/ML
INJECTION, EMULSION INTRAVENOUS PRN
Status: DISCONTINUED | OUTPATIENT
Start: 2024-02-14 | End: 2024-02-14 | Stop reason: SDUPTHER

## 2024-02-14 RX ORDER — SODIUM CHLORIDE, SODIUM LACTATE, POTASSIUM CHLORIDE, CALCIUM CHLORIDE 600; 310; 30; 20 MG/100ML; MG/100ML; MG/100ML; MG/100ML
INJECTION, SOLUTION INTRAVENOUS CONTINUOUS
Status: DISCONTINUED | OUTPATIENT
Start: 2024-02-14 | End: 2024-02-14 | Stop reason: HOSPADM

## 2024-02-14 RX ORDER — LIDOCAINE HYDROCHLORIDE AND EPINEPHRINE 10; 10 MG/ML; UG/ML
INJECTION, SOLUTION INFILTRATION; PERINEURAL PRN
Status: DISCONTINUED | OUTPATIENT
Start: 2024-02-14 | End: 2024-02-14 | Stop reason: ALTCHOICE

## 2024-02-14 RX ORDER — SODIUM CHLORIDE 0.9 % (FLUSH) 0.9 %
5-40 SYRINGE (ML) INJECTION EVERY 12 HOURS SCHEDULED
Status: DISCONTINUED | OUTPATIENT
Start: 2024-02-14 | End: 2024-02-14 | Stop reason: HOSPADM

## 2024-02-14 RX ORDER — GLYCOPYRROLATE 0.2 MG/ML
INJECTION INTRAMUSCULAR; INTRAVENOUS PRN
Status: DISCONTINUED | OUTPATIENT
Start: 2024-02-14 | End: 2024-02-14 | Stop reason: SDUPTHER

## 2024-02-14 RX ORDER — ONDANSETRON 2 MG/ML
4 INJECTION INTRAMUSCULAR; INTRAVENOUS EVERY 6 HOURS PRN
Status: DISCONTINUED | OUTPATIENT
Start: 2024-02-14 | End: 2024-02-14 | Stop reason: HOSPADM

## 2024-02-14 RX ORDER — DEXAMETHASONE SODIUM PHOSPHATE 4 MG/ML
INJECTION, SOLUTION INTRA-ARTICULAR; INTRALESIONAL; INTRAMUSCULAR; INTRAVENOUS; SOFT TISSUE PRN
Status: DISCONTINUED | OUTPATIENT
Start: 2024-02-14 | End: 2024-02-14 | Stop reason: SDUPTHER

## 2024-02-14 RX ORDER — MIDAZOLAM HYDROCHLORIDE 1 MG/ML
INJECTION INTRAMUSCULAR; INTRAVENOUS PRN
Status: DISCONTINUED | OUTPATIENT
Start: 2024-02-14 | End: 2024-02-14 | Stop reason: SDUPTHER

## 2024-02-14 RX ORDER — ONDANSETRON 2 MG/ML
INJECTION INTRAMUSCULAR; INTRAVENOUS PRN
Status: DISCONTINUED | OUTPATIENT
Start: 2024-02-14 | End: 2024-02-14 | Stop reason: SDUPTHER

## 2024-02-14 RX ORDER — AZITHROMYCIN 250 MG/1
250 TABLET, FILM COATED ORAL SEE ADMIN INSTRUCTIONS
Qty: 6 TABLET | Refills: 0 | Status: SHIPPED | OUTPATIENT
Start: 2024-02-14 | End: 2024-02-19

## 2024-02-14 RX ORDER — FENTANYL CITRATE 50 UG/ML
INJECTION, SOLUTION INTRAMUSCULAR; INTRAVENOUS PRN
Status: DISCONTINUED | OUTPATIENT
Start: 2024-02-14 | End: 2024-02-14 | Stop reason: SDUPTHER

## 2024-02-14 RX ORDER — EPINEPHRINE 1 MG/ML
INJECTION, SOLUTION, CONCENTRATE INTRAVENOUS PRN
Status: DISCONTINUED | OUTPATIENT
Start: 2024-02-14 | End: 2024-02-14 | Stop reason: ALTCHOICE

## 2024-02-14 RX ORDER — SODIUM CHLORIDE 9 MG/ML
INJECTION, SOLUTION INTRAVENOUS PRN
Status: DISCONTINUED | OUTPATIENT
Start: 2024-02-14 | End: 2024-02-14 | Stop reason: HOSPADM

## 2024-02-14 RX ORDER — ONDANSETRON 4 MG/1
4 TABLET, FILM COATED ORAL EVERY 12 HOURS PRN
Qty: 6 TABLET | Refills: 0 | Status: SHIPPED | OUTPATIENT
Start: 2024-02-14 | End: 2024-02-17

## 2024-02-14 RX ORDER — BACITRACIN ZINC 500 [USP'U]/G
OINTMENT TOPICAL PRN
Status: DISCONTINUED | OUTPATIENT
Start: 2024-02-14 | End: 2024-02-14 | Stop reason: ALTCHOICE

## 2024-02-14 RX ORDER — TRAMADOL HYDROCHLORIDE 50 MG/1
50 TABLET ORAL EVERY 6 HOURS PRN
Qty: 12 TABLET | Refills: 0 | Status: SHIPPED | OUTPATIENT
Start: 2024-02-14 | End: 2024-02-17

## 2024-02-14 RX ORDER — SODIUM CHLORIDE 0.9 % (FLUSH) 0.9 %
5-40 SYRINGE (ML) INJECTION PRN
Status: DISCONTINUED | OUTPATIENT
Start: 2024-02-14 | End: 2024-02-14 | Stop reason: HOSPADM

## 2024-02-14 RX ORDER — LIDOCAINE 40 MG/G
CREAM TOPICAL EVERY 30 MIN PRN
Status: DISCONTINUED | OUTPATIENT
Start: 2024-02-14 | End: 2024-02-14 | Stop reason: HOSPADM

## 2024-02-14 RX ADMIN — FENTANYL CITRATE 50 MCG: 50 INJECTION, SOLUTION INTRAMUSCULAR; INTRAVENOUS at 13:27

## 2024-02-14 RX ADMIN — GLYCOPYRROLATE 0.2 MG: 0.2 INJECTION, SOLUTION INTRAMUSCULAR; INTRAVENOUS at 14:03

## 2024-02-14 RX ADMIN — SODIUM CHLORIDE: 9 INJECTION, SOLUTION INTRAVENOUS at 12:49

## 2024-02-14 RX ADMIN — FENTANYL CITRATE 50 MCG: 50 INJECTION, SOLUTION INTRAMUSCULAR; INTRAVENOUS at 13:13

## 2024-02-14 RX ADMIN — FENTANYL CITRATE 100 MCG: 50 INJECTION, SOLUTION INTRAMUSCULAR; INTRAVENOUS at 12:53

## 2024-02-14 RX ADMIN — PROPOFOL 350 MG: 10 INJECTION, EMULSION INTRAVENOUS at 12:56

## 2024-02-14 RX ADMIN — PROPOFOL 30 MG: 10 INJECTION, EMULSION INTRAVENOUS at 13:18

## 2024-02-14 RX ADMIN — GLYCOPYRROLATE 0.2 MG: 0.2 INJECTION, SOLUTION INTRAMUSCULAR; INTRAVENOUS at 12:55

## 2024-02-14 RX ADMIN — MIDAZOLAM 2 MG: 1 INJECTION INTRAMUSCULAR; INTRAVENOUS at 12:49

## 2024-02-14 RX ADMIN — DEXAMETHASONE SODIUM PHOSPHATE 12 MG: 4 INJECTION, SOLUTION INTRAMUSCULAR; INTRAVENOUS at 13:00

## 2024-02-14 RX ADMIN — WATER 2000 MG: 1 INJECTION INTRAMUSCULAR; INTRAVENOUS; SUBCUTANEOUS at 13:00

## 2024-02-14 RX ADMIN — ONDANSETRON 4 MG: 2 INJECTION INTRAMUSCULAR; INTRAVENOUS at 13:00

## 2024-02-14 NOTE — ANESTHESIA POSTPROCEDURE EVALUATION
Department of Anesthesiology  Postprocedure Note    Patient: Nehemias Schwarz  MRN: [id number]  YOB: 1980  Date of evaluation: 2/14/2024    Procedure Summary       Date: 02/14/24 Room / Location: [address]    Anesthesia Start: 1249 Anesthesia Stop: 1445    Procedure: RIGHT COCHLEAR IMPLANT WITH FACIAL NERVE MONITORING (Right: Ear) Diagnosis:       Sensory hearing loss, bilateral      (Sensory hearing loss, bilateral [H90.3])    Surgeons: Marta Garrison MD Responsible Provider: Melissa Calloway DO    Anesthesia Type: General ASA Status: 3            Anesthesia Type: General    Fabien Phase I: Fabien Score: 10    Fabien Phase II: Fabien Score: 10    Anesthesia Post Evaluation    Patient location during evaluation: PACU  Patient participation: complete - patient participated  Level of consciousness: awake and alert  Airway patency: patent  Nausea & Vomiting: no nausea and no vomiting  Cardiovascular status: hemodynamically stable  Respiratory status: acceptable  Hydration status: euvolemic  Pain management: adequate    No notable events documented.

## 2024-02-14 NOTE — OP NOTE
Patient ID:  Date of Procedure: 2/14/2024  Patient name: Nehemias Schwarz  YOB: 1980  Medical record number: [id number]    Surgeon: Dr. Garrison    Assistant: None    Pre op: bilateral profound SNHL    Post op: same    Procedure:  right cochlear implant with endoscopic assistance, free tissue graft, facial nerve monitoring, intraoperative neural response testing    HPI: Nehemias Schwarz is a 44 y.o. male with bilateral profound SNHL    I discussed the risks and benefits of the cochlear implantation which includes persistent hearing loss, failure to improve communication, facial nerve weakness or injury, CSF leak, infection, ear numbness, dizziness, ringing of the ears, device failure, poor hearing.   I emphasized the need for regular appointments after implanantation surgery to make sure that programming of the device was optimal, for optimal benefit from the device.    Procedure: Patient was brought to the OR and induced under general anesthesia, without nitrous or paralysis with an LMA. The right site was verified, and marked with the templates. The scans were loaded in the operating room for easy visualization. The facial nerve monitor was placed and tested and found to be working appropriately. The ear was cleaned and examined under the microscope to verify that it was healthy.    The table was turned 180 degrees. The ear was prepped and drapped in a sterile fashion. The postauricular area was injected with 10ml of 1% lidocaine with epinephrine. A postauricular incision was made, and the ear was elevated anteriorly. A T-shaped periosteal incision was made and the ear was retracted anteriorly. A tight periosteal pocket was created to tightly fit the device    A small temporalis facial graft was harvested and preserved in saline.    A simple cortical mastoidectomy was performed. The landmarks were noted. A facial recess approach was started, with care being taken to protect the facial nerve. The chorda

## 2024-02-14 NOTE — PROGRESS NOTES
Patient was here for cochlear implantation on the right, with FLEX24 array (SN:[id number]). No extracochlear electrodes. Impedance values were within normal limits across the electrode array. AutoART was completed and responses were present on electrodes 1-7, 9-12, no response on electrode 8.  Results discussed with Dr. Garrison.     The initial activation is scheduled for 2/29.    Juan Manuel Peters/CCC-A  [place] [id number]  Electronically signed by Juan Manuel Peters on 2/14/2024 at 1:36 PM

## 2024-02-14 NOTE — H&P
Nehemias Schwarz was seen and re-examined preoperatively today, February 14, 2024.  There was no substantial change in his physical and medical status. All Meds and Family/Social/Previous history was reviewed and there were no significant changes. Patient is fit for the proposed surgical procedure.  All questions were appropriately addressed and had no further questions regarding the risks, benefits, and alternatives of the procedure.  Nehemias Schwarz and family wished to proceed.    Miles Candelario DO  Resident Physician  [facility]  Otolaryngology Residency  2/14/2024  12:21 PM    
importance of compliance with the treatment plan as well as documenting on the day of the visit.     A/P:       Nehemias Schwarz is a 43 y.o. male with Bilateral profound sensorineural hearing loss confirmed by Audiogram, who will benefit from Hearing loss evaluation and possible consideration for cochlear implantation . The rest of the exam was unremarkable     Fine cut CT scan of the temporal bones without contrast and MRI with IAC protocol were done and were normal  Information given to family to discuss  Lengthy discussion that the goals of CI would be better access to sound as opposed to learning speech but it would need to be the whole process for him to have the best success  Vaccine needed, planned for 1/19/24  Risks and benefits reviewed  Registry signed  Plan for right ear based on patient choice (audiologic and medically, ears are similar)  We reviewed old records

## 2024-02-14 NOTE — DISCHARGE INSTRUCTIONS
POSTOP EAR SURGERY INSTRUCTIONS  Regular diet  Remove dressing on 2/15/24  No strenuous activity for 2 weeks  Alternate OTC tylenol with ibuprofen for pain control  Prescription if provided for breakthrough pain control   He can gently wash his hair starting 2/15/24  Topical antibiotic ointment to any visible incision 2X/day for 2 weeks  Keep follow up appointment

## 2024-02-14 NOTE — ANESTHESIA PRE PROCEDURE
Laterality Date    ABDOMEN SURGERY  1979    colostomy    ABDOMEN SURGERY  1979    reversal 2mo later    VT COLONOSCOPY FLX DX W/COLLJ SPEC WHEN PFRMD N/A 9/20/2018    COLONOSCOPY - hearing impaired performed by Randy Haji MD at [facility] ENDOSCOPY       Social History:    Social History     Tobacco Use    Smoking status: Never     Passive exposure: Never    Smokeless tobacco: Never   Substance Use Topics    Alcohol use: No                                Counseling given: Not Answered      Vital Signs (Current):   Vitals:    02/09/24 1345 02/14/24 1200   BP:  138/84   Pulse:  86   Resp:  16   Temp:  97.5 °F (36.4 °C)   SpO2:  96%   Weight: 94.3 kg (208 lb)    Height: 1.626 m (5' 4\")                                               BP Readings from Last 3 Encounters:   02/14/24 138/84   09/20/18 114/60   09/20/18 111/78       NPO Status: Time of last liquid consumption: 1930                        Time of last solid consumption: 1930                        Date of last liquid consumption: 02/13/24                        Date of last solid food consumption: 02/13/24    BMI:   Wt Readings from Last 3 Encounters:   02/09/24 94.3 kg (208 lb)   01/18/24 95.3 kg (210 lb)   11/02/23 95.3 kg (210 lb)     Body mass index is 35.7 kg/m².    CBC: No results found for: \"WBC\", \"RBC\", \"HGB\", \"HCT\", \"MCV\", \"RDW\", \"PLT\"    CMP: No results found for: \"NA\", \"K\", \"CL\", \"CO2\", \"BUN\", \"CREATININE\", \"GFRAA\", \"AGRATIO\", \"LABGLOM\", \"GLUCOSE\", \"GLU\", \"PROT\", \"CALCIUM\", \"BILITOT\", \"ALKPHOS\", \"AST\", \"ALT\"    POC Tests: No results for input(s): \"POCGLU\", \"POCNA\", \"POCK\", \"POCCL\", \"POCBUN\", \"POCHEMO\", \"POCHCT\" in the last 72 hours.    Coags: No results found for: \"PROTIME\", \"INR\", \"APTT\"    HCG (If Applicable): No results found for: \"PREGTESTUR\", \"PREGSERUM\", \"HCG\", \"HCGQUANT\"     ABGs: No results found for: \"PHART\", \"PO2ART\", \"XIF1JLW\", \"JTL5IGM\", \"BEART\", \"B0TXDXQI\"     Type & Screen (If Applicable):  No results found for: \"LABABO\",

## 2024-02-27 NOTE — PROGRESS NOTES
\"RDW\", \"NEUTOPHILPCT\", \"LYMPHOPCT\", \"MONOPCT\", \"EOSRELPCT\", \"BASOPCT\", \"NEUTROABS\", \"LYMPHSABS\", \"MONOABSOL\", \"EOSABS\", \"BASOABPOC\"    A/P:     Nehemias Schwarz is a 44 y.o. male s/p right CI 2/14/24 with Philippe flex 24 with an uneventful postoperative course; PPSV 1/19/24;   Activation today which went well  Wear CI all waking hours   Homework and speech therapy to start   Can resume normal activity   Follow up in 3mo    Marta Garrison MD MS FACS FAAP  02/29/24 8:33 AM   Director Otology and Cochlear Implant Programs

## 2024-02-29 ENCOUNTER — PROCEDURE VISIT (OUTPATIENT)
Dept: AUDIOLOGY | Age: 44
End: 2024-02-29
Payer: MEDICARE

## 2024-02-29 ENCOUNTER — OFFICE VISIT (OUTPATIENT)
Dept: ENT CLINIC | Age: 44
End: 2024-02-29

## 2024-02-29 VITALS — WEIGHT: 208 LBS | BODY MASS INDEX: 35.51 KG/M2 | HEIGHT: 64 IN

## 2024-02-29 DIAGNOSIS — H90.3 BILATERAL SENSORINEURAL HEARING LOSS: Primary | ICD-10-CM

## 2024-02-29 DIAGNOSIS — F80.9 COMMUNICATION PROBLEM: ICD-10-CM

## 2024-02-29 DIAGNOSIS — H90.3 SENSORINEURAL HEARING LOSS (SNHL) OF BOTH EARS: Primary | ICD-10-CM

## 2024-02-29 PROCEDURE — 92603 COCHLEAR IMPLT F/UP EXAM 7/>: CPT

## 2024-02-29 PROCEDURE — 99024 POSTOP FOLLOW-UP VISIT: CPT | Performed by: OTOLARYNGOLOGY

## 2024-02-29 NOTE — PROGRESS NOTES
Patient was seen for CI activation, after medical clearance. SN: [id number], magnet strength: 5+. Impedances were good.    iPad  utilized during appointment. [employer] Representative Alida Carver present for the appointment.     Patient was not able to fully connect to the magnet site due to thickness between internal and external magnet. Patient's cousin shaved part of head in office to better fit external magnet to magnet site. Compression on magnet was applied and impedances and testing was able to be ran.    Increased M levels until audible and comfortable. Patient reported beeping sounds. Patient was satisfied with loudness and comfort.    Reviewed progressive levels/MAPs. Counseled patient, patient's mother and patient's cousin on progressive MAPs and how to properly utilize them with his remote.    Reviewed battery usage and charging. Practiced putting processor on/taking processor off. Counseled on watching magnet site for any redness.     Gave patient [facility] handouts, expectations sheet, and aural rehab information. Patient's family is signed up for [facility] Monday's.     Getting more information in regards to compression wraps from [employer] representative to help with magnet site.     Patient will follow up 3/14. Will program Reid 3 at this appointment to make sure patient can utilize other processor. Representative will be present at next appointment.     JuanM anuel Peters/CCC-A  [place] Lic [id number]  Electronically signed by Juan Manuel Peters on 2/29/2024 at 1:16 PM

## 2024-03-12 ENCOUNTER — TELEPHONE (OUTPATIENT)
Dept: ENT CLINIC | Age: 44
End: 2024-03-12

## 2024-03-12 DIAGNOSIS — H90.3 SENSORY HEARING LOSS, BILATERAL: Primary | ICD-10-CM

## 2024-03-12 DIAGNOSIS — F80.4 SPEECH AND LANGUAGE DEVELOPMENT DELAY DUE TO HEARING LOSS: ICD-10-CM

## 2024-03-14 ENCOUNTER — PROCEDURE VISIT (OUTPATIENT)
Dept: AUDIOLOGY | Age: 44
End: 2024-03-14

## 2024-03-14 ENCOUNTER — TELEPHONE (OUTPATIENT)
Dept: ENT CLINIC | Age: 44
End: 2024-03-14

## 2024-03-14 ENCOUNTER — TREATMENT (OUTPATIENT)
Age: 44
End: 2024-03-14
Payer: MEDICARE

## 2024-03-14 DIAGNOSIS — F80.4 SPEECH AND LANGUAGE DEVELOPMENT DELAY DUE TO HEARING LOSS: ICD-10-CM

## 2024-03-14 DIAGNOSIS — H90.3 SENSORINEURAL HEARING LOSS (SNHL) OF BOTH EARS: Primary | ICD-10-CM

## 2024-03-14 DIAGNOSIS — H90.3 SENSORY HEARING LOSS, BILATERAL: Primary | ICD-10-CM

## 2024-03-14 PROCEDURE — 92507 TX SP LANG VOICE COMM INDIV: CPT | Performed by: SPEECH-LANGUAGE PATHOLOGIST

## 2024-03-14 NOTE — PROGRESS NOTES
Objective:    The clinician targeted Ling sounds including \"oo\", \"ah\", \"ee\", and \"mm\". The pt was able to vocally imitate each of these sounds correctly following clinician model. The clinician then covered her mouth with a shield and asked the pt to identify the sounds that she spoke- the pt identified \"ee\" in 3 trials, \"ah\" in 1 trial, \"mm\" in 1 trial, and \"oo\" in 2 trials.       Assessment:    Pt continues to make progress toward therapy goals.       Plan:    Continue to implement current intervention plan per POC.                                        Shilpa Callahan MS, CCC-SLP      CPT code(s) 09569  speech/language tx  Total minutes :  30 minutes

## 2024-03-15 NOTE — PROGRESS NOTES
[facility] Audiology Representative present for appointment. iPad  used for appointment.    Patient was here for CI follow up, after activation 2/29. Impedances were good. Magnet site looked good.     Patient reported wearing processor all waking hours. Datalogging confirmed 7.7 hours.    Patient came in on program 3. Patient reported buzzing and beeping, but needed more volume. Increased MCLs overall. Patient was satisfied with loudness and comfort.    Reviewed battery usage and charging.     Patient's family signed up for [facility] Monday's with [facility] representative.    Patient will follow up 4/18.    Juan Manuel Peters/CCC-A  [place] Lic [id number]  Electronically signed by Juan Manuel Peters on 3/15/2024 at 8:01 AM

## 2024-03-18 ENCOUNTER — PROCEDURE VISIT (OUTPATIENT)
Dept: AUDIOLOGY | Age: 44
End: 2024-03-18

## 2024-03-18 DIAGNOSIS — H90.3 SENSORINEURAL HEARING LOSS (SNHL) OF BOTH EARS: Primary | ICD-10-CM

## 2024-03-18 PROCEDURE — 99024 POSTOP FOLLOW-UP VISIT: CPT

## 2024-03-21 ENCOUNTER — TREATMENT (OUTPATIENT)
Age: 44
End: 2024-03-21
Payer: MEDICARE

## 2024-03-21 DIAGNOSIS — F80.4 SPEECH AND LANGUAGE DEVELOPMENT DELAY DUE TO HEARING LOSS: ICD-10-CM

## 2024-03-21 DIAGNOSIS — H90.3 SENSORY HEARING LOSS, BILATERAL: Primary | ICD-10-CM

## 2024-03-21 PROCEDURE — 92507 TX SP LANG VOICE COMM INDIV: CPT | Performed by: SPEECH-LANGUAGE PATHOLOGIST

## 2024-03-21 NOTE — PROGRESS NOTES
\"ee\", and \"mm\". The pt was able to vocally imitate each of these sounds correctly following clinician model. The clinician then covered her mouth with a shield and asked the pt to identify the sounds that she spoke- the pt identified \"ee\" in 0 trials, \"ah\" in 1 trial, \"mm\" in 1 trial, and \"oo\" in 1 trial.    Monosyllabic:    The clinician provided the pt with a field of 4 monosyllabic words and the clinician and pt recited each back and forth. Pt identified the correct word in 25% of opportunities.          Assessment:     Pt continues to make progress toward therapy goals.         Plan:     Continue to implement current intervention plan per POC.                                                           Shilpa Callahan MS, CCC-SLP        CPT code(s) 76408  speech/language tx  Total minutes :  30 minutes

## 2024-03-25 ENCOUNTER — TELEPHONE (OUTPATIENT)
Dept: ENT CLINIC | Age: 44
End: 2024-03-25

## 2024-03-27 ENCOUNTER — PROCEDURE VISIT (OUTPATIENT)
Dept: AUDIOLOGY | Age: 44
End: 2024-03-27

## 2024-03-27 DIAGNOSIS — H90.3 SENSORINEURAL HEARING LOSS (SNHL) OF BOTH EARS: Primary | ICD-10-CM

## 2024-03-27 PROCEDURE — 99024 POSTOP FOLLOW-UP VISIT: CPT

## 2024-03-27 NOTE — PROGRESS NOTES
Patient came in due to new processor not working. Troubleshooted with disposable battery toggle for processor and lights on processor worked. Discovered part that battery toggles into to connect battery and processor was not working and broken. Will order new piece to be sent to patient's house. Gave patient cochlear implant disposable batteries until new piece comes in.    Electronically signed by Juan Manuel Peters on 3/27/2024 at 1:19 PM

## 2024-04-04 ENCOUNTER — TELEPHONE (OUTPATIENT)
Dept: SPEECH THERAPY | Age: 44
End: 2024-04-04

## 2024-04-04 NOTE — TELEPHONE ENCOUNTER
Attempted to contact patient to cancel speech therapy appointment today due to therapist illness.  Not able to leave a voicemail.

## 2024-04-11 ENCOUNTER — TREATMENT (OUTPATIENT)
Age: 44
End: 2024-04-11
Payer: MEDICARE

## 2024-04-11 DIAGNOSIS — F80.4 SPEECH AND LANGUAGE DEVELOPMENT DELAY DUE TO HEARING LOSS: ICD-10-CM

## 2024-04-11 DIAGNOSIS — H90.3 SENSORY HEARING LOSS, BILATERAL: Primary | ICD-10-CM

## 2024-04-11 PROCEDURE — 92507 TX SP LANG VOICE COMM INDIV: CPT | Performed by: SPEECH-LANGUAGE PATHOLOGIST

## 2024-04-11 NOTE — PROGRESS NOTES
SPEECH LANGUAGE PATHOLOGY  DAILY PROGRESS NOTE           PATIENT NAME:  Nehemias Schwarz      :  1980          TODAY'S DATE:  2024                       SHORT/LONG TERM GOALS     Detect all Ling-6 sounds at 3 ft., 6 ft., and 9 ft.      2. Discriminate between two words that differ in syllable length achieving greater than 90% accuracy.      3. Identify the correct word given a set of 5-10 familiar words achieving greater than 90% accuracy.      4. Answer questions or follow directions presented auditorily regarding a known topic achieving greater than 90% accuracy.      5.  Identify the correct monosyllabic word given a set of 4-6 familiar words achieving greater than 90% accuracy.      6. Discriminate between words in which the initial consonant differ only in voicing (minimal pairs) achieving greater than 90% accuracy.      7. Follow instructions presented auditorily only containing two critical elements achieving greater than 90% accuracy.      8. Identify the correct phrase or sentence from a set of 4-8 achieving greater than 90% accuracy.      9. Read aloud directions changing one word- have patient determine the word that was changed in a common phrase achieving greater than 90% accuracy.      10.  Minimal pairs in sentences- have the patient discriminate between the two words achieving greater than 90% accuracy.      11.  Correctly imitate a 3-5 word phrase presented auditorily achieving greater than 90% accuracy.     12. Identify the topic of a short paragraph read aloud achieving greater than 90% accuracy.      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 Pt's mom was present for the duration of the session.      Clinician utilized [employer] interpreting for this session- #[id number] Brandie.        Objective:     The clinician targeted all 6 Ling sounds. Pt imitated each of these sounds correctly following clinician model. The

## 2024-04-18 ENCOUNTER — PROCEDURE VISIT (OUTPATIENT)
Dept: AUDIOLOGY | Age: 44
End: 2024-04-18
Payer: MEDICARE

## 2024-04-18 ENCOUNTER — HOSPITAL ENCOUNTER (OUTPATIENT)
Dept: SPEECH THERAPY | Age: 44
Setting detail: THERAPIES SERIES
Discharge: HOME | End: 2024-04-18
Payer: MEDICARE

## 2024-04-18 DIAGNOSIS — H90.3 SENSORINEURAL HEARING LOSS (SNHL) OF BOTH EARS: Primary | ICD-10-CM

## 2024-04-18 PROCEDURE — 92507 TX SP LANG VOICE COMM INDIV: CPT | Performed by: SPEECH-LANGUAGE PATHOLOGIST

## 2024-04-18 PROCEDURE — 92604 REPROGRAM COCHLEAR IMPLT 7/>: CPT

## 2024-04-18 NOTE — PROGRESS NOTES
[facility] Audiology Representative present for appointment. iPad  used.    Patient was here for CI follow up, after last appointment 3/14. Impedances were good. Magnet site looked good.    Patient reported wearing processor all waking hours. Datalogging confirmed 6.7 hours.    Patient came in on MAP 4. Increased overall M levels significantly. Patient was satisfied with loudness and comfort.    Soundfield testing was completed and results were in the mild to moderate hearing loss range.    Reviewed progressive levels/MAPs.     Programmed Baggs 3 processor and magnet did not stick to patient's magnet site. Will swap for another Bioscale 2 processor and program at next appointment.    Counseled patient on dry-aid kit for processor and to not put rechargeable battery into the dry aid kit.    Patient will follow up 6/6.    Juan Manuel Peters/CCC-JOAN  [place] Lic [id number]  Electronically signed by Juan Manuel Peters on 4/18/2024 at 1:48 PM

## 2024-04-18 NOTE — PROGRESS NOTES
SPEECH LANGUAGE PATHOLOGY  DAILY PROGRESS NOTE           PATIENT NAME:  Nehemias Schwarz      :  1980          TODAY'S DATE:  2024                       SHORT/LONG TERM GOALS     Detect all Ling-6 sounds at 3 ft., 6 ft., and 9 ft.      2. Discriminate between two words that differ in syllable length achieving greater than 90% accuracy.      3. Identify the correct word given a set of 5-10 familiar words achieving greater than 90% accuracy.      4. Answer questions or follow directions presented auditorily regarding a known topic achieving greater than 90% accuracy.      5.  Identify the correct monosyllabic word given a set of 4-6 familiar words achieving greater than 90% accuracy.      6. Discriminate between words in which the initial consonant differ only in voicing (minimal pairs) achieving greater than 90% accuracy.      7. Follow instructions presented auditorily only containing two critical elements achieving greater than 90% accuracy.      8. Identify the correct phrase or sentence from a set of 4-8 achieving greater than 90% accuracy.      9. Read aloud directions changing one word- have patient determine the word that was changed in a common phrase achieving greater than 90% accuracy.      10.  Minimal pairs in sentences- have the patient discriminate between the two words achieving greater than 90% accuracy.      11.  Correctly imitate a 3-5 word phrase presented auditorily achieving greater than 90% accuracy.     12. Identify the topic of a short paragraph read aloud achieving greater than 90% accuracy.      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 Pt's mom was present for the duration of the session.      Clinician utilized [employer] interpreting for this session- #[id number] Jennie; however, the  felt that the pt was not fully understanding her interpreting and ended up ending the session without warning.

## 2024-04-25 ENCOUNTER — HOSPITAL ENCOUNTER (OUTPATIENT)
Dept: SPEECH THERAPY | Age: 44
Setting detail: THERAPIES SERIES
Discharge: HOME | End: 2024-04-25
Payer: MEDICARE

## 2024-04-25 PROCEDURE — 92507 TX SP LANG VOICE COMM INDIV: CPT | Performed by: SPEECH-LANGUAGE PATHOLOGIST

## 2024-04-25 NOTE — PROGRESS NOTES
sounds that she spoke- the pt identified all 6 Ling sounds correctly from 3 and 6 feet away.     Monosyllabic:     The clinician provided the pt with a field of 4-6 monosyllabic words and the clinician and pt recited each back and forth. Pt identified the correct word in 95% (50 trials) of opportunities with min repetition.         Assessment:     Pt continues to make progress toward therapy goals.         Plan:     Continue to implement current intervention plan per POC.                                                           Shilpa Diez MS, CCC-SLP        CPT code(s) 48103  speech/language tx  Total minutes :  30 minutes   This was a shared visit with the [facility]. I reviewed and verified the documentation and independently performed the documented:

## 2024-05-02 ENCOUNTER — HOSPITAL ENCOUNTER (OUTPATIENT)
Dept: SPEECH THERAPY | Age: 44
Setting detail: THERAPIES SERIES
Discharge: HOME | End: 2024-05-02
Payer: MEDICARE

## 2024-05-02 PROCEDURE — 92507 TX SP LANG VOICE COMM INDIV: CPT | Performed by: SPEECH-LANGUAGE PATHOLOGIST

## 2024-05-09 ENCOUNTER — HOSPITAL ENCOUNTER (OUTPATIENT)
Dept: SPEECH THERAPY | Age: 44
Setting detail: THERAPIES SERIES
Discharge: HOME | End: 2024-05-09
Payer: MEDICARE

## 2024-05-09 PROCEDURE — 92507 TX SP LANG VOICE COMM INDIV: CPT | Performed by: SPEECH-LANGUAGE PATHOLOGIST

## 2024-05-09 NOTE — PROGRESS NOTES
SPEECH LANGUAGE PATHOLOGY  DAILY PROGRESS NOTE           PATIENT NAME:  Nehemias Schwarz      :  1980          TODAY'S DATE:  2024                       SHORT/LONG TERM GOALS     Detect all Ling-6 sounds at 3 ft., 6 ft., and 9 ft.      2. Discriminate between two words that differ in syllable length achieving greater than 90% accuracy.      3. Identify the correct word given a set of 5-10 familiar words achieving greater than 90% accuracy.      4. Answer questions or follow directions presented auditorily regarding a known topic achieving greater than 90% accuracy.      5.  Identify the correct monosyllabic word given a set of 4-6 familiar words achieving greater than 90% accuracy. GOAL MET     6. Discriminate between words in which the initial consonant differ only in voicing (minimal pairs) achieving greater than 90% accuracy.      7. Follow instructions presented auditorily only containing two critical elements achieving greater than 90% accuracy.      8. Identify the correct phrase or sentence from a set of 4-8 achieving greater than 90% accuracy.      9. Read aloud directions changing one word- have patient determine the word that was changed in a common phrase achieving greater than 90% accuracy.      10.  Minimal pairs in sentences- have the patient discriminate between the two words achieving greater than 90% accuracy.      11.  Correctly imitate a 3-5 word phrase presented auditorily achieving greater than 90% accuracy.     12. Identify the topic of a short paragraph read aloud achieving greater than 90% accuracy.      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 Pt's mom was present for the duration of the session.      Objective:    The clinician targeted expressive production and receptive identification of mono and multisyllabic everyday words. Most of the words targeted this date were household objects. The clinician and pt

## 2024-05-16 ENCOUNTER — HOSPITAL ENCOUNTER (OUTPATIENT)
Dept: SPEECH THERAPY | Age: 44
Setting detail: THERAPIES SERIES
Discharge: HOME | End: 2024-05-16
Payer: MEDICARE

## 2024-05-16 PROCEDURE — 92507 TX SP LANG VOICE COMM INDIV: CPT | Performed by: SPEECH-LANGUAGE PATHOLOGIST

## 2024-05-30 ENCOUNTER — HOSPITAL ENCOUNTER (OUTPATIENT)
Dept: SPEECH THERAPY | Age: 44
Setting detail: THERAPIES SERIES
Discharge: HOME | End: 2024-05-30
Payer: MEDICARE

## 2024-05-30 PROCEDURE — 92507 TX SP LANG VOICE COMM INDIV: CPT | Performed by: SPEECH-LANGUAGE PATHOLOGIST

## 2024-05-30 NOTE — PROGRESS NOTES
SPEECH LANGUAGE PATHOLOGY  DAILY PROGRESS NOTE           PATIENT NAME:  Nehemias Schwarz      :  1980          TODAY'S DATE:  2024                       SHORT/LONG TERM GOALS     Detect all Ling-6 sounds at 3 ft., 6 ft., and 9 ft.      2. Discriminate between two words that differ in syllable length achieving greater than 90% accuracy.      3. Identify the correct word given a set of 5-10 familiar words achieving greater than 90% accuracy.      4. Answer questions or follow directions presented auditorily regarding a known topic achieving greater than 90% accuracy.      5.  Identify the correct monosyllabic word given a set of 4-6 familiar words achieving greater than 90% accuracy. GOAL MET     6. Discriminate between words in which the initial consonant differ only in voicing (minimal pairs) achieving greater than 90% accuracy.      7. Follow instructions presented auditorily only containing two critical elements achieving greater than 90% accuracy.      8. Identify the correct phrase or sentence from a set of 4-8 achieving greater than 90% accuracy.      9. Read aloud directions changing one word- have patient determine the word that was changed in a common phrase achieving greater than 90% accuracy.      10.  Minimal pairs in sentences- have the patient discriminate between the two words achieving greater than 90% accuracy.      11.  Correctly imitate a 3-5 word phrase presented auditorily achieving greater than 90% accuracy.     12. Identify the topic of a short paragraph read aloud achieving greater than 90% accuracy.      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 Pt's mom was present for the duration of the session.      Objective:    The clinician targeted expressive production and receptive identification of mono and multisyllabic everyday words as well as phonemes in isolation. For monosyllabic and multisyllabic words the

## 2024-06-06 ENCOUNTER — PROCEDURE VISIT (OUTPATIENT)
Dept: AUDIOLOGY | Age: 44
End: 2024-06-06
Payer: MEDICARE

## 2024-06-06 ENCOUNTER — HOSPITAL ENCOUNTER (OUTPATIENT)
Dept: SPEECH THERAPY | Age: 44
Setting detail: THERAPIES SERIES
Discharge: HOME | End: 2024-06-06
Payer: MEDICARE

## 2024-06-06 DIAGNOSIS — H90.3 SENSORINEURAL HEARING LOSS (SNHL) OF BOTH EARS: Primary | ICD-10-CM

## 2024-06-06 PROCEDURE — 92507 TX SP LANG VOICE COMM INDIV: CPT | Performed by: SPEECH-LANGUAGE PATHOLOGIST

## 2024-06-06 PROCEDURE — 92604 REPROGRAM COCHLEAR IMPLT 7/>: CPT

## 2024-06-06 NOTE — PROGRESS NOTES
[facility] Audiology Representative present for appointment. iPad  used.     Patient was here for CI follow up, after last appointment 4/18. Impedances were good. Magnet site looked good.    Patient reported wearing processor all waking hours. Datalogging could not be obtained due to software.    Patient reported doing overall better and able to hear different sounds around in his environment.  Increased M levels. Patient was satisfied with loudness and comfort.    Performed AutoART and results were within normal limits.     Patient's backup processor programmed (SN: [id number]). Will send black magnet cap to patient's house. Counseled patient on how to properly change the cover at home.     Speech testing was utilized with WIPI testing. 16% of words obtained in the sound field with CI only in quiet.     Patient will follow up 8/29.    Juan Manuel Peters/CCC-JOAN  [facility] [id number]  Electronically signed by Juan Manuel Peters on 6/6/2024 at 4:02 PM

## 2024-06-20 ENCOUNTER — HOSPITAL ENCOUNTER (OUTPATIENT)
Dept: SPEECH THERAPY | Age: 44
Setting detail: THERAPIES SERIES
Discharge: HOME | End: 2024-06-20
Payer: MEDICARE

## 2024-06-20 PROCEDURE — 92507 TX SP LANG VOICE COMM INDIV: CPT | Performed by: SPEECH-LANGUAGE PATHOLOGIST

## 2024-06-27 ENCOUNTER — HOSPITAL ENCOUNTER (OUTPATIENT)
Dept: SPEECH THERAPY | Age: 44
Setting detail: THERAPIES SERIES
Discharge: HOME | End: 2024-06-27
Payer: MEDICARE

## 2024-06-27 PROCEDURE — 92507 TX SP LANG VOICE COMM INDIV: CPT | Performed by: SPEECH-LANGUAGE PATHOLOGIST

## 2024-06-27 NOTE — PROGRESS NOTES
SPEECH LANGUAGE PATHOLOGY  DAILY PROGRESS NOTE           PATIENT NAME:  Nehemias Schwarz      :  1980          TODAY'S DATE:  2024                       SHORT/LONG TERM GOALS     Detect all Ling-6 sounds at 3 ft., 6 ft., and 9 ft.      2. Discriminate between two words that differ in syllable length achieving greater than 90% accuracy.      3. Identify the correct word given a set of 5-10 familiar words achieving greater than 90% accuracy.      4. Answer questions or follow directions presented auditorily regarding a known topic achieving greater than 90% accuracy.      5.  Identify the correct monosyllabic word given a set of 4-6 familiar words achieving greater than 90% accuracy. GOAL MET     6. Discriminate between words in which the initial consonant differ only in voicing (minimal pairs) achieving greater than 90% accuracy.      7. Follow instructions presented auditorily only containing two critical elements achieving greater than 90% accuracy.      8. Identify the correct phrase or sentence from a set of 4-8 achieving greater than 90% accuracy.      9. Read aloud directions changing one word- have patient determine the word that was changed in a common phrase achieving greater than 90% accuracy.      10.  Minimal pairs in sentences- have the patient discriminate between the two words achieving greater than 90% accuracy.      11.  Correctly imitate a 3-5 word phrase presented auditorily achieving greater than 90% accuracy.     12. Identify the topic of a short paragraph read aloud achieving greater than 90% accuracy.      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 Pt's mom was present for the duration of the session.      Objective:    The clinician targeted expressive production and receptive identification of phonemes in isolation. From a field of 5, the pt identified the correct phoneme with 50% accuracy and mod repetition. The

## 2024-07-11 ENCOUNTER — HOSPITAL ENCOUNTER (OUTPATIENT)
Dept: SPEECH THERAPY | Age: 44
Setting detail: THERAPIES SERIES
Discharge: HOME | End: 2024-07-11
Payer: MEDICARE

## 2024-07-11 PROCEDURE — 92507 TX SP LANG VOICE COMM INDIV: CPT | Performed by: SPEECH-LANGUAGE PATHOLOGIST

## 2024-07-18 ENCOUNTER — HOSPITAL ENCOUNTER (OUTPATIENT)
Dept: SPEECH THERAPY | Age: 44
Setting detail: THERAPIES SERIES
Discharge: HOME | End: 2024-07-18
Payer: MEDICARE

## 2024-07-18 NOTE — PROGRESS NOTES
Speech-Language Pathology  Cancellation/No Show Note      For today's appointment patient:    [x]  Cancelled                  []  Rescheduled appointment    []  No show       []  Therapist cancelled             Reason given by patient:  [x]  No reason given  []  Conflicting appointment  []  No transportation  []  Conflict with work  []  Illness  []  Inclement weather   []  Insurance related issues  []  Other           Comments:    Continue as per established POC    Shilpa Diez MS, CCC-SLP  7/18/2024

## 2024-07-25 ENCOUNTER — HOSPITAL ENCOUNTER (OUTPATIENT)
Dept: SPEECH THERAPY | Age: 44
Setting detail: THERAPIES SERIES
Discharge: HOME | End: 2024-07-25
Payer: MEDICARE

## 2024-07-25 PROCEDURE — 92507 TX SP LANG VOICE COMM INDIV: CPT | Performed by: SPEECH-LANGUAGE PATHOLOGIST

## 2024-08-01 ENCOUNTER — HOSPITAL ENCOUNTER (OUTPATIENT)
Dept: SPEECH THERAPY | Age: 44
Setting detail: THERAPIES SERIES
Discharge: HOME | End: 2024-08-01
Payer: MEDICARE

## 2024-08-01 PROCEDURE — 92507 TX SP LANG VOICE COMM INDIV: CPT | Performed by: SPEECH-LANGUAGE PATHOLOGIST

## 2024-08-08 ENCOUNTER — HOSPITAL ENCOUNTER (OUTPATIENT)
Dept: SPEECH THERAPY | Age: 44
Setting detail: THERAPIES SERIES
Discharge: HOME | End: 2024-08-08
Payer: MEDICARE

## 2024-08-08 NOTE — PROGRESS NOTES
Speech-Language Pathology  Cancellation/No Show Note      For today's appointment patient:    [x]  Cancelled                  []  Rescheduled appointment    []  No show       []  Therapist cancelled             Reason given by patient:  []  No reason given  []  Conflicting appointment  []  No transportation  []  Conflict with work  []  Illness  []  Inclement weather   []  Insurance related issues  [x]  Other           Comments: Cochlear implant batteries .    Continue as per established POC    Shilpa Diez MS, CCC-SLP  2024

## 2024-08-15 ENCOUNTER — HOSPITAL ENCOUNTER (OUTPATIENT)
Dept: SPEECH THERAPY | Age: 44
Setting detail: THERAPIES SERIES
Discharge: HOME | End: 2024-08-15
Payer: MEDICARE

## 2024-08-15 PROCEDURE — 92507 TX SP LANG VOICE COMM INDIV: CPT | Performed by: SPEECH-LANGUAGE PATHOLOGIST

## 2024-08-22 ENCOUNTER — HOSPITAL ENCOUNTER (OUTPATIENT)
Dept: SPEECH THERAPY | Age: 44
Setting detail: THERAPIES SERIES
Discharge: HOME | End: 2024-08-22
Payer: MEDICARE

## 2024-08-22 NOTE — PROGRESS NOTES
Speech-Language Pathology  Cancellation/No Show Note      For today's appointment patient:    [x]  Cancelled                  []  Rescheduled appointment    []  No show       []  Therapist cancelled             Reason given by patient:  []  No reason given  []  Conflicting appointment  []  No transportation  []  Conflict with work  []  Illness  []  Inclement weather   []  Insurance related issues  [x]  Other           Comments: Patient is having difficulties with processor- will resume speech following audiology adjustment next week.     Continue as per established POC    Shilpa Diez MS, CCC-SLP  8/22/2024

## 2024-08-29 ENCOUNTER — PROCEDURE VISIT (OUTPATIENT)
Dept: AUDIOLOGY | Age: 44
End: 2024-08-29
Payer: MEDICARE

## 2024-08-29 ENCOUNTER — HOSPITAL ENCOUNTER (OUTPATIENT)
Dept: SPEECH THERAPY | Age: 44
Setting detail: THERAPIES SERIES
Discharge: HOME | End: 2024-08-29
Payer: MEDICARE

## 2024-08-29 DIAGNOSIS — H90.3 SENSORINEURAL HEARING LOSS (SNHL) OF BOTH EARS: Primary | ICD-10-CM

## 2024-08-29 PROCEDURE — 92507 TX SP LANG VOICE COMM INDIV: CPT | Performed by: SPEECH-LANGUAGE PATHOLOGIST

## 2024-08-29 PROCEDURE — 92604 REPROGRAM COCHLEAR IMPLT 7/>: CPT

## 2024-09-05 ENCOUNTER — HOSPITAL ENCOUNTER (OUTPATIENT)
Dept: SPEECH THERAPY | Age: 44
Setting detail: THERAPIES SERIES
Discharge: HOME | End: 2024-09-05
Payer: MEDICARE

## 2024-09-05 PROCEDURE — 92507 TX SP LANG VOICE COMM INDIV: CPT | Performed by: SPEECH-LANGUAGE PATHOLOGIST

## 2024-09-12 ENCOUNTER — HOSPITAL ENCOUNTER (OUTPATIENT)
Dept: SPEECH THERAPY | Age: 44
Setting detail: THERAPIES SERIES
Discharge: HOME | End: 2024-09-12
Payer: MEDICARE

## 2024-09-19 ENCOUNTER — HOSPITAL ENCOUNTER (OUTPATIENT)
Dept: SPEECH THERAPY | Age: 44
Setting detail: THERAPIES SERIES
End: 2024-09-19
Payer: MEDICARE

## 2024-09-26 ENCOUNTER — HOSPITAL ENCOUNTER (OUTPATIENT)
Dept: SPEECH THERAPY | Age: 44
Setting detail: THERAPIES SERIES
Discharge: HOME | End: 2024-09-26
Payer: MEDICARE

## 2024-09-26 PROCEDURE — 92507 TX SP LANG VOICE COMM INDIV: CPT | Performed by: SPEECH-LANGUAGE PATHOLOGIST

## 2024-10-01 ENCOUNTER — TELEPHONE (OUTPATIENT)
Dept: AUDIOLOGY | Age: 44
End: 2024-10-01

## 2024-10-01 NOTE — TELEPHONE ENCOUNTER
Called patient's mom and LVM to make appointment due to decline in performance in speech therapy. Offered 10/10 at 3 PM.    Electronically signed by Juan Manuel Peters on 10/1/2024 at 9:44 AM

## 2024-10-03 ENCOUNTER — HOSPITAL ENCOUNTER (OUTPATIENT)
Dept: SPEECH THERAPY | Age: 44
Setting detail: THERAPIES SERIES
Discharge: HOME | End: 2024-10-03
Payer: MEDICARE

## 2024-10-03 PROCEDURE — 92507 TX SP LANG VOICE COMM INDIV: CPT | Performed by: SPEECH-LANGUAGE PATHOLOGIST

## 2024-10-03 NOTE — PROGRESS NOTES
SPEECH LANGUAGE PATHOLOGY  DAILY PROGRESS NOTE           PATIENT NAME:  Nehemias Schwarz      :  1980          TODAY'S DATE:  10/3/2024                    SHORT/LONG TERM GOALS     Detect all Ling-6 sounds at 3 ft., 6 ft., and 9 ft.      2. Discriminate between two words that differ in syllable length achieving greater than 90% accuracy.      3. Identify the correct word given a set of 5-10 familiar words achieving greater than 90% accuracy.      4. Answer questions or follow directions presented auditorily regarding a known topic achieving greater than 90% accuracy.      5.  Identify the correct monosyllabic word given a set of 4-6 familiar words achieving greater than 90% accuracy. GOAL MET     6. Discriminate between words in which the initial consonant differ only in voicing (minimal pairs) achieving greater than 90% accuracy.      7. Follow instructions presented auditorily only containing two critical elements achieving greater than 90% accuracy.      8. Identify the correct phrase or sentence from a set of 4-8 achieving greater than 90% accuracy.      9. Read aloud directions changing one word- have patient determine the word that was changed in a common phrase achieving greater than 90% accuracy.      10.  Minimal pairs in sentences- have the patient discriminate between the two words achieving greater than 90% accuracy.      11.  Correctly imitate a 3-5 word phrase presented auditorily achieving greater than 90% accuracy.     12. Identify the topic of a short paragraph read aloud achieving greater than 90% accuracy.      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 Audiologist to contact pt and his mom for sooner follow-up appointment secondary to concerns.    Objective:    Given a field of 10 everyday words (yes, no, okay, who, what, where, etc.), the pt identified the correct word in 7/10 opportunities and 3/10 opportunities across

## 2024-10-09 ENCOUNTER — TELEPHONE (OUTPATIENT)
Dept: ENT CLINIC | Age: 44
End: 2024-10-09

## 2024-10-09 NOTE — TELEPHONE ENCOUNTER
Pt mom Sissy called office and LVM requesting a return call from [facility].    Please advise.    Electronically signed by Rosi Jett on 10/9/2024 at 3:56 PM

## 2024-10-10 ENCOUNTER — HOSPITAL ENCOUNTER (OUTPATIENT)
Dept: SPEECH THERAPY | Age: 44
Setting detail: THERAPIES SERIES
Discharge: HOME | End: 2024-10-10
Payer: MEDICARE

## 2024-10-10 PROCEDURE — 92507 TX SP LANG VOICE COMM INDIV: CPT | Performed by: SPEECH-LANGUAGE PATHOLOGIST

## 2024-10-15 ENCOUNTER — TELEPHONE (OUTPATIENT)
Dept: AUDIOLOGY | Age: 44
End: 2024-10-15

## 2024-10-15 NOTE — TELEPHONE ENCOUNTER
Called patient's parent to see if patient could come in tomorrow at 10 am instead of Thursday, 10/17 due to cancellations. Will await return phone call.     Electronically signed by Juan Manuel Peters on 10/15/2024 at 2:57 PM

## 2024-10-17 ENCOUNTER — HOSPITAL ENCOUNTER (OUTPATIENT)
Dept: SPEECH THERAPY | Age: 44
Setting detail: THERAPIES SERIES
Discharge: HOME | End: 2024-10-17
Payer: MEDICARE

## 2024-10-17 ENCOUNTER — PROCEDURE VISIT (OUTPATIENT)
Dept: AUDIOLOGY | Age: 44
End: 2024-10-17
Payer: MEDICARE

## 2024-10-17 DIAGNOSIS — H90.3 SENSORINEURAL HEARING LOSS (SNHL) OF BOTH EARS: Primary | ICD-10-CM

## 2024-10-17 PROCEDURE — 92507 TX SP LANG VOICE COMM INDIV: CPT | Performed by: SPEECH-LANGUAGE PATHOLOGIST

## 2024-10-17 PROCEDURE — 92604 REPROGRAM COCHLEAR IMPLT 7/>: CPT

## 2024-10-17 NOTE — PROGRESS NOTES
Patient was here for CI follow up, after last appointment 8/29. Impedances were good. Magnet site looked good.     Patient reported wearing processor all waking hours. Datalogging confirmed 6.5 hours.    Patient's parent and speech pathologist reported a decrease in hearing overall since last visit. Patient's parent stated it has been within the last month or so that patient's hearing has decreased. Increased overall M levels significantly. Patient reported sound was good. Patient was satisfied with loudness and comfort.    Pulled out picture spondee board and worked through each picture/word and had patient point to the word. Patient was able to get all of them correct when he read lips, however only got a few correct when lips were covered.     Patient's mother reported that it seemed like an improvement since before. Will follow up after patient's next speech therapy appointment to see if it has gotten better since changes.     Patient will follow up 12/12.    Juan Manuel Peters/CCC-JOAN  [facility] [id number]  Electronically signed by Juan Manuel Peters on 10/17/2024 at 2:36 PM

## 2024-10-24 ENCOUNTER — HOSPITAL ENCOUNTER (OUTPATIENT)
Dept: SPEECH THERAPY | Age: 44
Setting detail: THERAPIES SERIES
Discharge: HOME | End: 2024-10-24
Payer: MEDICARE

## 2024-10-24 PROCEDURE — 92507 TX SP LANG VOICE COMM INDIV: CPT | Performed by: SPEECH-LANGUAGE PATHOLOGIST

## 2024-10-31 ENCOUNTER — HOSPITAL ENCOUNTER (OUTPATIENT)
Dept: SPEECH THERAPY | Age: 44
Setting detail: THERAPIES SERIES
Discharge: HOME | End: 2024-10-31
Payer: MEDICARE

## 2024-10-31 PROCEDURE — 92507 TX SP LANG VOICE COMM INDIV: CPT | Performed by: SPEECH-LANGUAGE PATHOLOGIST

## 2024-11-07 ENCOUNTER — HOSPITAL ENCOUNTER (OUTPATIENT)
Dept: SPEECH THERAPY | Age: 44
Setting detail: THERAPIES SERIES
Discharge: HOME | End: 2024-11-07
Payer: MEDICARE

## 2024-11-07 PROCEDURE — 92507 TX SP LANG VOICE COMM INDIV: CPT | Performed by: SPEECH-LANGUAGE PATHOLOGIST

## 2024-11-07 NOTE — PROGRESS NOTES
SPEECH LANGUAGE PATHOLOGY  DAILY PROGRESS NOTE           PATIENT NAME:  Nehemias Schwarz      :  1980          TODAY'S DATE:  2024                    SHORT/LONG TERM GOALS     Detect all Ling-6 sounds at 3 ft., 6 ft., and 9 ft.      2. Discriminate between two words that differ in syllable length achieving greater than 90% accuracy.      3. Identify the correct word given a set of 5-10 familiar words achieving greater than 90% accuracy.      4. Answer questions or follow directions presented auditorily regarding a known topic achieving greater than 90% accuracy.      5.  Identify the correct monosyllabic word given a set of 4-6 familiar words achieving greater than 90% accuracy. GOAL MET     6. Discriminate between words in which the initial consonant differ only in voicing (minimal pairs) achieving greater than 90% accuracy.      7. Follow instructions presented auditorily only containing two critical elements achieving greater than 90% accuracy.      8. Identify the correct phrase or sentence from a set of 4-8 achieving greater than 90% accuracy.      9. Read aloud directions changing one word- have patient determine the word that was changed in a common phrase achieving greater than 90% accuracy.      10.  Minimal pairs in sentences- have the patient discriminate between the two words achieving greater than 90% accuracy.      11.  Correctly imitate a 3-5 word phrase presented auditorily achieving greater than 90% accuracy.     12. Identify the topic of a short paragraph read aloud achieving greater than 90% accuracy.      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     Objective:    Given a field of 8 monosyllabic words, the pt identified the correct word in 72% of trials with multiple repetitions provided for the majority of words.     Clinician discussed pt change in confidence with word identification with pt's mom, which mom brought

## 2024-11-14 ENCOUNTER — HOSPITAL ENCOUNTER (OUTPATIENT)
Dept: SPEECH THERAPY | Age: 44
Setting detail: THERAPIES SERIES
Discharge: HOME | End: 2024-11-14
Payer: MEDICARE

## 2024-11-14 NOTE — PROGRESS NOTES
Speech-Language Pathology  Cancellation/No Show Note      For today's appointment patient:    [x]  Cancelled                  []  Rescheduled appointment    []  No show       []  Therapist cancelled             Reason given by patient:  []  No reason given  []  Conflicting appointment  []  No transportation  []  Conflict with work  [x]  Illness  []  Inclement weather   []  Insurance related issues  []  Other           Comments:    Continue as per established POC    Shilpa Diez MS, CCC-SLP  11/14/2024

## 2024-11-21 ENCOUNTER — HOSPITAL ENCOUNTER (OUTPATIENT)
Dept: SPEECH THERAPY | Age: 44
Setting detail: THERAPIES SERIES
Discharge: HOME | End: 2024-11-21
Payer: MEDICARE

## 2024-11-21 PROCEDURE — 92507 TX SP LANG VOICE COMM INDIV: CPT | Performed by: SPEECH-LANGUAGE PATHOLOGIST

## 2024-12-05 ENCOUNTER — HOSPITAL ENCOUNTER (OUTPATIENT)
Dept: SPEECH THERAPY | Age: 44
Setting detail: THERAPIES SERIES
Discharge: HOME | End: 2024-12-05

## 2024-12-05 NOTE — PROGRESS NOTES
Speech-Language Pathology  Cancellation/No Show Note      For today's appointment patient:    [x]  Cancelled                  []  Rescheduled appointment    []  No show       []  Therapist cancelled             Reason given by patient:  []  No reason given  [x]  Conflicting appointment  []  No transportation  []  Conflict with work  []  Illness  []  Inclement weather   []  Insurance related issues  []  Other           Comments:    Continue as per established POC    Shilpa Diez MS, CCC-SLP  12/5/2024

## 2024-12-12 ENCOUNTER — HOSPITAL ENCOUNTER (OUTPATIENT)
Dept: SPEECH THERAPY | Age: 44
Setting detail: THERAPIES SERIES
Discharge: HOME | End: 2024-12-12
Payer: MEDICARE

## 2024-12-12 ENCOUNTER — PROCEDURE VISIT (OUTPATIENT)
Dept: AUDIOLOGY | Age: 44
End: 2024-12-12
Payer: MEDICARE

## 2024-12-12 DIAGNOSIS — H90.3 SENSORINEURAL HEARING LOSS (SNHL) OF BOTH EARS: Primary | ICD-10-CM

## 2024-12-12 PROCEDURE — 92604 REPROGRAM COCHLEAR IMPLT 7/>: CPT

## 2024-12-12 PROCEDURE — 92507 TX SP LANG VOICE COMM INDIV: CPT | Performed by: SPEECH-LANGUAGE PATHOLOGIST

## 2024-12-12 NOTE — PROGRESS NOTES
Patient was here for CI follow up, after last appointment 10/17. Impedances were good. Magnet site looked good.    Patient reported wearing processor all waking hours. Datalogging confirmed 8.4 hours.    Patient reported that processor microphone cover was popping off and could not be put back down on processor. Will send new processor to patient's house.     Patient reported doing overall better, but needed more volume. Increased volume overall for patient, as well as increased higher electrodes. Patient was satisfied with loudness and comfort.    Patient was able to identify all spondee pictures on the board when reading lips. Without lips patient was able to identify a few different spondee pictures with ASL assistance.     Patient will follow up 4/17.    Juan Manuel Peters/CCC-A  [facility] [id number]  Electronically signed by Juan Manuel Peters on 12/12/2024 at 1:49 PM

## 2024-12-19 ENCOUNTER — HOSPITAL ENCOUNTER (OUTPATIENT)
Dept: SPEECH THERAPY | Age: 44
Setting detail: THERAPIES SERIES
Discharge: HOME | End: 2024-12-19
Payer: MEDICARE

## 2024-12-19 NOTE — PROGRESS NOTES
Speech-Language Pathology  Cancellation/No Show Note      For today's appointment patient:    [x]  Cancelled                  []  Rescheduled appointment    []  No show       []  Therapist cancelled             Reason given by patient:  []  No reason given  []  Conflicting appointment  []  No transportation  []  Conflict with work  [x]  Illness  []  Inclement weather   []  Insurance related issues  []  Other           Comments:    Continue as per established POC    Shilpa Diez MS, CCC-SLP  12/19/2024

## 2025-01-09 ENCOUNTER — HOSPITAL ENCOUNTER (OUTPATIENT)
Dept: SPEECH THERAPY | Age: 45
Setting detail: THERAPIES SERIES
Discharge: HOME | End: 2025-01-09

## 2025-01-09 NOTE — PROGRESS NOTES
Speech-Language Pathology  Cancellation/No Show Note      For today's appointment patient:    [x]  Cancelled                  []  Rescheduled appointment    []  No show       []  Therapist cancelled             Reason given by patient:  []  No reason given  []  Conflicting appointment  [x]  No transportation  []  Conflict with work  []  Illness  []  Inclement weather   []  Insurance related issues  []  Other           Comments: Car won't start    Continue as per established POC    Shilpa Diez MS, CCC-SLP  1/9/2025

## 2025-01-16 ENCOUNTER — HOSPITAL ENCOUNTER (OUTPATIENT)
Dept: SPEECH THERAPY | Age: 45
Setting detail: THERAPIES SERIES
Discharge: HOME | End: 2025-01-16

## 2025-01-16 NOTE — PROGRESS NOTES
Speech-Language Pathology  Cancellation/No Show Note      For today's appointment patient:    [x]  Cancelled                  []  Rescheduled appointment    []  No show       []  Therapist cancelled             Reason given by patient:  []  No reason given  []  Conflicting appointment  [x]  No transportation  []  Conflict with work  []  Illness  []  Inclement weather   []  Insurance related issues  []  Other           Comments:    Continue as per established POC    Shilpa Diez MS, CCC-SLP  1/16/2025

## 2025-01-23 ENCOUNTER — HOSPITAL ENCOUNTER (OUTPATIENT)
Dept: SPEECH THERAPY | Age: 45
Setting detail: THERAPIES SERIES
Discharge: HOME | End: 2025-01-23
Payer: MEDICARE

## 2025-01-23 PROCEDURE — 92507 TX SP LANG VOICE COMM INDIV: CPT | Performed by: SPEECH-LANGUAGE PATHOLOGIST

## 2025-01-30 ENCOUNTER — HOSPITAL ENCOUNTER (OUTPATIENT)
Dept: SPEECH THERAPY | Age: 45
Setting detail: THERAPIES SERIES
Discharge: HOME | End: 2025-01-30
Payer: MEDICARE

## 2025-01-30 PROCEDURE — 92507 TX SP LANG VOICE COMM INDIV: CPT | Performed by: SPEECH-LANGUAGE PATHOLOGIST

## 2025-02-13 ENCOUNTER — HOSPITAL ENCOUNTER (OUTPATIENT)
Dept: SPEECH THERAPY | Age: 45
Setting detail: THERAPIES SERIES
Discharge: HOME | End: 2025-02-13
Payer: MEDICARE

## 2025-02-13 PROCEDURE — 92507 TX SP LANG VOICE COMM INDIV: CPT | Performed by: SPEECH-LANGUAGE PATHOLOGIST

## 2025-02-20 ENCOUNTER — HOSPITAL ENCOUNTER (OUTPATIENT)
Dept: SPEECH THERAPY | Age: 45
Setting detail: THERAPIES SERIES
Discharge: HOME | End: 2025-02-20
Payer: MEDICARE

## 2025-02-20 NOTE — PROGRESS NOTES
Speech-Language Pathology  Cancellation/No Show Note      For today's appointment patient:    [x]  Cancelled                  []  Rescheduled appointment    []  No show       []  Therapist cancelled             Reason given by patient:  []  No reason given  []  Conflicting appointment  [x]  No transportation  []  Conflict with work  []  Illness  []  Inclement weather   []  Insurance related issues  []  Other           Comments:    Continue as per established POC    Shilpa Diez MS, CCC-SLP  2/20/2025

## 2025-02-27 ENCOUNTER — HOSPITAL ENCOUNTER (OUTPATIENT)
Dept: SPEECH THERAPY | Age: 45
Setting detail: THERAPIES SERIES
Discharge: HOME | End: 2025-02-27
Payer: MEDICARE

## 2025-02-27 PROCEDURE — 92507 TX SP LANG VOICE COMM INDIV: CPT | Performed by: SPEECH-LANGUAGE PATHOLOGIST

## 2025-03-06 ENCOUNTER — HOSPITAL ENCOUNTER (OUTPATIENT)
Dept: SPEECH THERAPY | Age: 45
Setting detail: THERAPIES SERIES
Discharge: HOME | End: 2025-03-06

## 2025-03-06 NOTE — PROGRESS NOTES
Speech-Language Pathology  Cancellation/No Show Note      For today's appointment patient:    [x]  Cancelled                  []  Rescheduled appointment    []  No show       []  Therapist cancelled             Reason given by patient:  [x]  No reason given  []  Conflicting appointment  []  No transportation  []  Conflict with work  []  Illness  []  Inclement weather   []  Insurance related issues  []  Other           Comments:    Continue as per established POC    Shilpa Diez MS, CCC-SLP  3/6/2025

## 2025-03-13 ENCOUNTER — HOSPITAL ENCOUNTER (OUTPATIENT)
Dept: SPEECH THERAPY | Age: 45
Setting detail: THERAPIES SERIES
Discharge: HOME | End: 2025-03-13
Payer: MEDICARE

## 2025-03-13 PROCEDURE — 92507 TX SP LANG VOICE COMM INDIV: CPT | Performed by: SPEECH-LANGUAGE PATHOLOGIST

## 2025-03-13 NOTE — PROGRESS NOTES
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 Focus of current treatment sessions has been on aural rehabilitation following cochlear implantation.    OBJECTIVE / GOAL STATUS   (Status Key: GM = Goal Met, MP = Making Progress, BP = Beginning Progress, NI = Not Introduced, D/C = Discontinue Goal, NM = Not Met)    Detect all Ling-6 sounds at 3 ft., 6 ft., and 9 ft. GOAL MET     2. Discriminate between two words that differ in syllable length achieving greater than 90% accuracy. GOAL MET     3. Identify the correct word given a set of 5-10 familiar words achieving greater than 90% accuracy. MP     4. Answer questions or follow directions presented auditorily regarding a known topic achieving greater than 90% accuracy. NI     5.  Identify the correct monosyllabic word given a set of 4-6 familiar words achieving greater than 90% accuracy. GOAL MET     6. Discriminate between words in which the initial consonant differ only in voicing (minimal pairs) achieving greater than 90% accuracy. NI     7. Follow instructions presented auditorily only containing two critical elements achieving greater than 90% accuracy. NI     8. Identify the correct phrase or sentence from a set of 4-8 achieving greater than 90% accuracy. NI     9. Read aloud directions changing one word- have patient determine the word that was changed in a common phrase achieving greater than 90% accuracy. NI     10.  Minimal pairs in sentences- have

## 2025-04-17 ENCOUNTER — PROCEDURE VISIT (OUTPATIENT)
Dept: AUDIOLOGY | Age: 45
End: 2025-04-17
Payer: MEDICARE

## 2025-04-17 DIAGNOSIS — H90.3 SENSORINEURAL HEARING LOSS (SNHL) OF BOTH EARS: Primary | ICD-10-CM

## 2025-04-17 PROCEDURE — 92604 REPROGRAM COCHLEAR IMPLT 7/>: CPT

## 2025-04-17 NOTE — PROGRESS NOTES
Patient was here for CI follow up, after last appointment 12/12/24. Impedances were good. Magnet site looked good.     Patient reported wearing processor all waking hours. Datalogging confirmed 3 hours. Expressed importance of wearing device as many hours during the day as he can.    Patient wanted volume increased overall. Patient was satisfied with loudness and comfort. Patient utilizing disposable batteries for the time being due to patient's batteries not working/breaking.    Patient was able to identify all spondee pictures on the board when reading lips. Without lips patient was able to identify a few different spondee pictures with ASL assistance.     Emailed [facility] representative regarding new batteries to be sent to patient's house.     Patient will follow up 10/9/24.    Juan Manuel Peters/CCC-A  [place] [facility] [id number]  Electronically signed by Juan Manuel Peters on 4/17/2025 at 10:49 AM

## (undated) DEVICE — PROBE 8225101 5PK STD PRASS FL TIP ROHS

## (undated) DEVICE — 2.0MM FINE DIAMOND ROUND EXTENDED

## (undated) DEVICE — Device

## (undated) DEVICE — SURGICAL PROCEDURE PACK EENT CUST

## (undated) DEVICE — POUCH FLD COLL W/ DRNGE PRT N LINTING TEAR RESIST MOLD STRP

## (undated) DEVICE — KIT SURG W7XL11IN 2 PKT UNTREATED NA

## (undated) DEVICE — 6.0MM COARSE DIAMOND ROUND

## (undated) DEVICE — MARKER,SKIN,WI/RULER AND LABELS: Brand: MEDLINE

## (undated) DEVICE — 1 ML TUBERCULIN SYRINGE,DETACHABLE NEEDLE: Brand: MONOJECT

## (undated) DEVICE — STANDARD HYPODERMIC NEEDLE,ALUMINUM HUB: Brand: MONOJECT

## (undated) DEVICE — GLOVE SURG SZ 55 CRM LTX FREE POLYISOPRENE POLYMER BEAD ANTI

## (undated) DEVICE — CATHETER IV 18 GAX1.25 IN WNG INTROCAN SAFETY

## (undated) DEVICE — 1.0MM FINE DIAMOND ROUND EXTENDED

## (undated) DEVICE — NEEDLE FLTR 18GA L1.5IN MEM THK5UM BLNT DISP

## (undated) DEVICE — TOWEL,OR,DSP,ST,BLUE,STD,6/PK,12PK/CS: Brand: MEDLINE

## (undated) DEVICE — DRAPE MICROSCOPE PRESCOTT

## (undated) DEVICE — BLADE OPHTH GRN ROUNDED TIP 1 SIDE SHRP GRINDLESS MINI-BLDE

## (undated) DEVICE — SKN PREP SPNG STKS PVP PNT STR: Brand: MEDLINE INDUSTRIES, INC.

## (undated) DEVICE — CANNULA NSL ORAL AD FOR CAPNOFLEX CO2 O2 AIRLFE

## (undated) DEVICE — DISPOSABLE IRRIGATION CASSETTE: Brand: CORE

## (undated) DEVICE — SPECIMEN CUP W/LID: Brand: DEROYAL

## (undated) DEVICE — 18 GA N.G. KIT, 10 PACK: Brand: SITE-RITE

## (undated) DEVICE — 4-PORT MANIFOLD: Brand: NEPTUNE 2

## (undated) DEVICE — DRESSING EAR AD 5.5IN GLSCOCK

## (undated) DEVICE — SYRINGE, LUER LOCK, 10ML: Brand: MEDLINE

## (undated) DEVICE — BLADE,STAINLESS-STEEL,15,STRL,DISPOSABLE: Brand: MEDLINE

## (undated) DEVICE — BLADE, TONGUE, 6", STERILE: Brand: MEDLINE

## (undated) DEVICE — PACK PROCEDURE SURG GEN CUST

## (undated) DEVICE — SOLUTION IV 1000ML 0.9% SOD CHL PH 5 INJ USP VIAFLX PLAS

## (undated) DEVICE — CORD,CAUTERY,BIPOLAR,STERILE: Brand: MEDLINE

## (undated) DEVICE — SYRINGE MEDICAL 3ML CLEAR PLASTIC STANDARD NON CONTROL LUERLOCK TIP DISPOSABLE

## (undated) DEVICE — 7CM ELITE IRRIGATION SLEEVE

## (undated) DEVICE — DOUBLE BASIN SET: Brand: MEDLINE INDUSTRIES, INC.

## (undated) DEVICE — 1.5MM FINE DIAMOND ROUND EXTENDED

## (undated) DEVICE — MASTISOL ADHESIVE LIQ 2/3ML

## (undated) DEVICE — KIT,ANTI FOG,W/SPONGE & FLUID,SOFT PACK: Brand: MEDLINE

## (undated) DEVICE — DRESSING EAR PED 3.5IN PLAS SHELL 2 MOLD PDDED ADJ VELC

## (undated) DEVICE — ELECTRODE 8227410 PAIRED 2 CH SET ROHS

## (undated) DEVICE — COVER HNDL LT DISP

## (undated) DEVICE — 1000 S-DRAPE TOWEL DRAPE 10/BX: Brand: STERI-DRAPE™

## (undated) DEVICE — SOLUTION IRRIG 1000ML 0.9% SOD CHL USP POUR PLAS BTL